# Patient Record
Sex: FEMALE | Race: ASIAN | NOT HISPANIC OR LATINO | Employment: FULL TIME | URBAN - NONMETROPOLITAN AREA
[De-identification: names, ages, dates, MRNs, and addresses within clinical notes are randomized per-mention and may not be internally consistent; named-entity substitution may affect disease eponyms.]

---

## 2018-02-02 ENCOUNTER — OFFICE VISIT CONVERTED (OUTPATIENT)
Dept: FAMILY MEDICINE CLINIC | Age: 53
End: 2018-02-02
Attending: NURSE PRACTITIONER

## 2018-02-06 LAB
AGE GDLN ACOG TESTING: NORMAL
HPV I/H RISK 4 DNA CVX QL PROBE+SIG AMP: NEGATIVE

## 2018-04-03 ENCOUNTER — OFFICE VISIT CONVERTED (OUTPATIENT)
Dept: FAMILY MEDICINE CLINIC | Age: 53
End: 2018-04-03
Attending: NURSE PRACTITIONER

## 2019-01-08 ENCOUNTER — HOSPITAL ENCOUNTER (OUTPATIENT)
Dept: OTHER | Facility: HOSPITAL | Age: 54
Discharge: HOME | End: 2019-01-08
Attending: NURSE PRACTITIONER

## 2019-01-08 ENCOUNTER — OFFICE VISIT CONVERTED (OUTPATIENT)
Dept: FAMILY MEDICINE CLINIC | Age: 54
End: 2019-01-08
Attending: NURSE PRACTITIONER

## 2019-01-08 LAB
ALBUMIN SERPL-MCNC: 4 G/DL (ref 3.5–5)
ALBUMIN/GLOB SERPL: 1.1 {RATIO} (ref 1.4–2.6)
ALP SERPL-CCNC: 75 U/L (ref 53–141)
ALT SERPL-CCNC: 19 U/L (ref 10–40)
ANION GAP SERPL CALC-SCNC: 17 MMOL/L (ref 8–19)
AST SERPL-CCNC: 24 U/L (ref 15–50)
BASOPHILS # BLD MANUAL: 0.08 10*3/UL (ref 0–0.2)
BASOPHILS NFR BLD MANUAL: 1.3 % (ref 0–3)
BILIRUB SERPL-MCNC: 0.26 MG/DL (ref 0.2–1.3)
BUN SERPL-MCNC: 15 MG/DL (ref 5–25)
BUN/CREAT SERPL: 23 {RATIO} (ref 6–20)
CALCIUM SERPL-MCNC: 9.4 MG/DL (ref 8.7–10.4)
CHLORIDE SERPL-SCNC: 103 MMOL/L (ref 99–111)
CHOLEST SERPL-MCNC: 183 MG/DL (ref 107–200)
CHOLEST/HDLC SERPL: 3 {RATIO} (ref 3–6)
CONV CO2: 26 MMOL/L (ref 22–32)
CONV TOTAL PROTEIN: 7.7 G/DL (ref 6.3–8.2)
CREAT UR-MCNC: 0.64 MG/DL (ref 0.5–0.9)
DEPRECATED RDW RBC AUTO: 34.4 FL
EOSINOPHIL # BLD MANUAL: 0.68 10*3/UL (ref 0–0.7)
EOSINOPHIL NFR BLD MANUAL: 11.5 % (ref 0–7)
ERYTHROCYTE [DISTWIDTH] IN BLOOD BY AUTOMATED COUNT: 16.5 % (ref 11.5–14.5)
GFR SERPLBLD BASED ON 1.73 SQ M-ARVRAT: >60 ML/MIN/{1.73_M2}
GLOBULIN UR ELPH-MCNC: 3.7 G/DL (ref 2–3.5)
GLUCOSE SERPL-MCNC: 85 MG/DL (ref 65–99)
GRANS (ABSOLUTE): 2.8 10*3/UL (ref 2–8)
GRANS: 47.2 % (ref 30–85)
HBA1C MFR BLD: 11.4 G/DL (ref 12–16)
HCT VFR BLD AUTO: 37.5 % (ref 37–47)
HDLC SERPL-MCNC: 62 MG/DL (ref 40–60)
IMM GRANULOCYTES # BLD: 0.01 10*3/UL (ref 0–0.54)
IMM GRANULOCYTES NFR BLD: 0.2 % (ref 0–0.43)
LDLC SERPL CALC-MCNC: 99 MG/DL (ref 70–100)
LYMPHOCYTES # BLD MANUAL: 1.9 10*3/UL (ref 1–5)
LYMPHOCYTES NFR BLD MANUAL: 7.8 % (ref 3–10)
MCH RBC QN AUTO: 19.5 PG (ref 27–31)
MCHC RBC AUTO-ENTMCNC: 30.4 G/DL (ref 33–37)
MCV RBC AUTO: 64 FL (ref 81–99)
MONOCYTES # BLD AUTO: 0.46 10*3/UL (ref 0.2–1.2)
OSMOLALITY SERPL CALC.SUM OF ELEC: 292 MOSM/KG (ref 273–304)
PLATELET # BLD AUTO: 281 10*3/UL (ref 130–400)
PMV BLD AUTO: 9.9 FL (ref 7.4–10.4)
POTASSIUM SERPL-SCNC: 4.6 MMOL/L (ref 3.5–5.3)
RBC # BLD AUTO: 5.86 10*6/UL (ref 4.2–5.4)
SODIUM SERPL-SCNC: 141 MMOL/L (ref 135–147)
TRIGL SERPL-MCNC: 110 MG/DL (ref 40–150)
TSH SERPL-ACNC: 1.43 M[IU]/L (ref 0.27–4.2)
VARIANT LYMPHS NFR BLD MANUAL: 32 % (ref 20–45)
VLDLC SERPL-MCNC: 22 MG/DL (ref 5–37)
WBC # BLD AUTO: 5.93 10*3/UL (ref 4.8–10.8)

## 2019-01-10 LAB — HCV AB SER DONR QL: 0.2 S/CO RATIO (ref 0–0.9)

## 2019-03-09 ENCOUNTER — OFFICE VISIT CONVERTED (OUTPATIENT)
Dept: FAMILY MEDICINE CLINIC | Age: 54
End: 2019-03-09
Attending: NURSE PRACTITIONER

## 2019-04-26 ENCOUNTER — OFFICE VISIT CONVERTED (OUTPATIENT)
Dept: FAMILY MEDICINE CLINIC | Age: 54
End: 2019-04-26
Attending: NURSE PRACTITIONER

## 2019-06-07 ENCOUNTER — OFFICE VISIT CONVERTED (OUTPATIENT)
Dept: FAMILY MEDICINE CLINIC | Age: 54
End: 2019-06-07
Attending: FAMILY MEDICINE

## 2019-06-07 ENCOUNTER — HOSPITAL ENCOUNTER (OUTPATIENT)
Dept: OTHER | Facility: HOSPITAL | Age: 54
Discharge: HOME | End: 2019-06-07
Attending: FAMILY MEDICINE

## 2019-07-15 ENCOUNTER — OFFICE VISIT CONVERTED (OUTPATIENT)
Dept: FAMILY MEDICINE CLINIC | Age: 54
End: 2019-07-15
Attending: NURSE PRACTITIONER

## 2020-01-27 ENCOUNTER — HOSPITAL ENCOUNTER (OUTPATIENT)
Dept: OTHER | Facility: HOSPITAL | Age: 55
Discharge: HOME | End: 2020-01-27
Attending: NURSE PRACTITIONER

## 2020-01-27 LAB
ALBUMIN SERPL-MCNC: 3.8 G/DL (ref 3.5–5)
ALBUMIN/GLOB SERPL: 1 {RATIO} (ref 1.4–2.6)
ALP SERPL-CCNC: 65 U/L (ref 53–141)
ALT SERPL-CCNC: 24 U/L (ref 10–40)
ANION GAP SERPL CALC-SCNC: 18 MMOL/L (ref 8–19)
AST SERPL-CCNC: 36 U/L (ref 15–50)
BASOPHILS # BLD AUTO: 0.06 10*3/UL (ref 0–0.2)
BASOPHILS NFR BLD AUTO: 0.9 % (ref 0–3)
BILIRUB SERPL-MCNC: 0.43 MG/DL (ref 0.2–1.3)
BUN SERPL-MCNC: 14 MG/DL (ref 5–25)
BUN/CREAT SERPL: 26 {RATIO} (ref 6–20)
CALCIUM SERPL-MCNC: 9.1 MG/DL (ref 8.7–10.4)
CHLORIDE SERPL-SCNC: 105 MMOL/L (ref 99–111)
CHOLEST SERPL-MCNC: 192 MG/DL (ref 107–200)
CHOLEST/HDLC SERPL: 3 {RATIO} (ref 3–6)
CONV ABS IMM GRAN: 0.01 10*3/UL (ref 0–0.2)
CONV CO2: 22 MMOL/L (ref 22–32)
CONV IMMATURE GRAN: 0.1 % (ref 0–1.8)
CONV TOTAL PROTEIN: 7.5 G/DL (ref 6.3–8.2)
CREAT UR-MCNC: 0.54 MG/DL (ref 0.5–0.9)
DEPRECATED RDW RBC AUTO: 33.5 FL (ref 36.4–46.3)
EOSINOPHIL # BLD AUTO: 0.36 10*3/UL (ref 0–0.7)
EOSINOPHIL # BLD AUTO: 5.2 % (ref 0–7)
ERYTHROCYTE [DISTWIDTH] IN BLOOD BY AUTOMATED COUNT: 15.1 % (ref 11.7–14.4)
GFR SERPLBLD BASED ON 1.73 SQ M-ARVRAT: >60 ML/MIN/{1.73_M2}
GLOBULIN UR ELPH-MCNC: 3.7 G/DL (ref 2–3.5)
GLUCOSE SERPL-MCNC: 103 MG/DL (ref 65–99)
HCT VFR BLD AUTO: 37.1 % (ref 37–47)
HDLC SERPL-MCNC: 65 MG/DL (ref 40–60)
HGB BLD-MCNC: 11.2 G/DL (ref 12–16)
LDLC SERPL CALC-MCNC: 117 MG/DL (ref 70–100)
LYMPHOCYTES # BLD AUTO: 2.28 10*3/UL (ref 1–5)
LYMPHOCYTES NFR BLD AUTO: 32.8 % (ref 20–45)
MCH RBC QN AUTO: 19.6 PG (ref 27–31)
MCHC RBC AUTO-ENTMCNC: 30.2 G/DL (ref 33–37)
MCV RBC AUTO: 64.9 FL (ref 81–99)
MONOCYTES # BLD AUTO: 0.35 10*3/UL (ref 0.2–1.2)
MONOCYTES NFR BLD AUTO: 5 % (ref 3–10)
NEUTROPHILS # BLD AUTO: 3.9 10*3/UL (ref 2–8)
NEUTROPHILS NFR BLD AUTO: 56 % (ref 30–85)
NRBC CBCN: 0 % (ref 0–0.7)
OSMOLALITY SERPL CALC.SUM OF ELEC: 293 MOSM/KG (ref 273–304)
PLATELET # BLD AUTO: 207 10*3/UL (ref 130–400)
PMV BLD AUTO: 10.4 FL (ref 9.4–12.3)
POTASSIUM SERPL-SCNC: 4 MMOL/L (ref 3.5–5.3)
RBC # BLD AUTO: 5.72 10*6/UL (ref 4.2–5.4)
SODIUM SERPL-SCNC: 141 MMOL/L (ref 135–147)
TRIGL SERPL-MCNC: 50 MG/DL (ref 40–150)
TSH SERPL-ACNC: 0.67 M[IU]/L (ref 0.27–4.2)
VLDLC SERPL-MCNC: 10 MG/DL (ref 5–37)
WBC # BLD AUTO: 6.96 10*3/UL (ref 4.8–10.8)

## 2020-02-24 ENCOUNTER — OFFICE VISIT CONVERTED (OUTPATIENT)
Dept: FAMILY MEDICINE CLINIC | Age: 55
End: 2020-02-24
Attending: NURSE PRACTITIONER

## 2020-03-11 ENCOUNTER — HOSPITAL ENCOUNTER (OUTPATIENT)
Dept: OTHER | Facility: HOSPITAL | Age: 55
Discharge: HOME | End: 2020-03-11
Attending: NURSE PRACTITIONER

## 2020-09-14 ENCOUNTER — OFFICE VISIT CONVERTED (OUTPATIENT)
Dept: FAMILY MEDICINE CLINIC | Age: 55
End: 2020-09-14
Attending: NURSE PRACTITIONER

## 2021-01-25 ENCOUNTER — OFFICE VISIT CONVERTED (OUTPATIENT)
Dept: FAMILY MEDICINE CLINIC | Age: 56
End: 2021-01-25
Attending: NURSE PRACTITIONER

## 2021-01-25 ENCOUNTER — HOSPITAL ENCOUNTER (OUTPATIENT)
Dept: OTHER | Facility: HOSPITAL | Age: 56
Discharge: HOME | End: 2021-01-25
Attending: NURSE PRACTITIONER

## 2021-01-25 LAB
25(OH)D3 SERPL-MCNC: 25.2 NG/ML (ref 30–100)
ALBUMIN SERPL-MCNC: 4.1 G/DL (ref 3.5–5)
ALBUMIN/GLOB SERPL: 1 {RATIO} (ref 1.4–2.6)
ALP SERPL-CCNC: 82 U/L (ref 53–141)
ALT SERPL-CCNC: 18 U/L (ref 10–40)
ANION GAP SERPL CALC-SCNC: 12 MMOL/L (ref 8–19)
AST SERPL-CCNC: 25 U/L (ref 15–50)
BASOPHILS # BLD MANUAL: 0.09 10*3/UL (ref 0–0.2)
BASOPHILS NFR BLD MANUAL: 1.5 % (ref 0–3)
BILIRUB SERPL-MCNC: 0.52 MG/DL (ref 0.2–1.3)
BUN SERPL-MCNC: 13 MG/DL (ref 5–25)
BUN/CREAT SERPL: 20 {RATIO} (ref 6–20)
CALCIUM SERPL-MCNC: 9.4 MG/DL (ref 8.7–10.4)
CHLORIDE SERPL-SCNC: 101 MMOL/L (ref 99–111)
CHOLEST SERPL-MCNC: 202 MG/DL (ref 107–200)
CHOLEST/HDLC SERPL: 2.5 {RATIO} (ref 3–6)
CONV CO2: 27 MMOL/L (ref 22–32)
CONV TOTAL PROTEIN: 8.1 G/DL (ref 6.3–8.2)
CREAT UR-MCNC: 0.64 MG/DL (ref 0.5–0.9)
DEPRECATED RDW RBC AUTO: 34.3 FL
EOSINOPHIL # BLD MANUAL: 0.37 10*3/UL (ref 0–0.7)
EOSINOPHIL NFR BLD MANUAL: 6.1 % (ref 0–7)
ERYTHROCYTE [DISTWIDTH] IN BLOOD BY AUTOMATED COUNT: 17 % (ref 11.5–14.5)
GFR SERPLBLD BASED ON 1.73 SQ M-ARVRAT: >60 ML/MIN/{1.73_M2}
GLOBULIN UR ELPH-MCNC: 4 G/DL (ref 2–3.5)
GLUCOSE SERPL-MCNC: 106 MG/DL (ref 65–99)
GRANS (ABSOLUTE): 3.23 10*3/UL (ref 2–8)
GRANS: 53.3 % (ref 30–85)
HBA1C MFR BLD: 12.2 G/DL (ref 12–16)
HCT VFR BLD AUTO: 39.4 % (ref 37–47)
HDLC SERPL-MCNC: 82 MG/DL (ref 40–60)
IMM GRANULOCYTES # BLD: 0.01 10*3/UL (ref 0–0.54)
IMM GRANULOCYTES NFR BLD: 0.2 % (ref 0–0.43)
LDLC SERPL CALC-MCNC: 106 MG/DL (ref 70–100)
LYMPHOCYTES # BLD MANUAL: 2.03 10*3/UL (ref 1–5)
LYMPHOCYTES NFR BLD MANUAL: 5.3 % (ref 3–10)
MCH RBC QN AUTO: 19.8 PG (ref 27–31)
MCHC RBC AUTO-ENTMCNC: 31 G/DL (ref 33–37)
MCV RBC AUTO: 64.1 FL (ref 81–99)
MONOCYTES # BLD AUTO: 0.32 10*3/UL (ref 0.2–1.2)
OSMOLALITY SERPL CALC.SUM OF ELEC: 283 MOSM/KG (ref 273–304)
PLATELET # BLD AUTO: 299 10*3/UL (ref 130–400)
PMV BLD AUTO: 9.9 FL (ref 7.4–10.4)
POTASSIUM SERPL-SCNC: 4.3 MMOL/L (ref 3.5–5.3)
RBC # BLD AUTO: 6.15 10*6/UL (ref 4.2–5.4)
SODIUM SERPL-SCNC: 136 MMOL/L (ref 135–147)
TRIGL SERPL-MCNC: 71 MG/DL (ref 40–150)
TSH SERPL-ACNC: 0.53 M[IU]/L (ref 0.27–4.2)
VARIANT LYMPHS NFR BLD MANUAL: 33.6 % (ref 20–45)
VLDLC SERPL-MCNC: 14 MG/DL (ref 5–37)
WBC # BLD AUTO: 6.05 10*3/UL (ref 4.8–10.8)

## 2021-06-05 NOTE — PROGRESS NOTES
"Lisa Luna 1965     Office/Outpatient Visit    Visit Date:  09:39 am    Provider: Lelo Correa N.P. (Assistant: Dorene Correa MA)    Location: [facility]        Electronically signed by Lelo Correa N.P. on  2019 10:55:54 AM                             SUBJECTIVE:        CC:     Ms. Luna is a 53 year old  female.  Patient is here for yearly check up.;         HPI:         HEALTH RISK PROFILE (\"At Risk\" items are starred):     Smoking: ** Currently smokes ( 1-5 cig per day );     Cancer Screening: ** Not current with screening mammograms;     Current with Pap smears with no history of abnormal results;  ** Has never had a screening flexible sigmoidoscopy;     Immunization Status: last tetanud ;      Nutrition: tries to healthy some times;     Physical Activity: Exercises at least 3 times per week;     Alcohol/Drug Use: Rarely drinks alcohol; No illicit drug use;     Safety: Always wears seatbelt;     ROS:     CONSTITUTIONAL:  Negative for chills and fever.      EYES:  Negative for blurred vision.      E/N/T:  Negative for diminished hearing and nasal congestion.      CARDIOVASCULAR:  Negative for chest pain and palpitations.      RESPIRATORY:  Negative for recent cough and dyspnea.      GASTROINTESTINAL:  Negative for abdominal pain, nausea and vomiting.      MUSCULOSKELETAL:  Negative for arthralgias and myalgias.      INTEGUMENTARY/BREAST:  Negative for atypical mole(s) and rash.      NEUROLOGICAL:  Negative for paresthesias and weakness.      PSYCHIATRIC:  Negative for anxiety and depression.          PMH/FMH/SH:     Last Reviewed on 2019 09:50 AM by Lelo Correa    Past Medical History:                 PAST MEDICAL HISTORY     UNREMARKABLE         GYNECOLOGICAL HISTORY:        Menopause at age 48.          PREVENTIVE HEALTH MAINTENANCE             COLORECTAL CANCER SCREENING: declines colorectal cancer screening, understands " reason for testing     MAMMOGRAM: declines, understands reason for testing has never been done     PAP SMEAR: was last done  with normal results         Surgical History:     NONE         Family History:         Positive for Lymphoma ( father ).      Mother:  at age 75     Daughter(s): 2 daughter(s) total;  Type 2 Diabetes         Social History: [place]     Occupation: a factory.   [employer] works third shift /also works [employer] part time     Marital Status:      Children: 2 children         Tobacco/Alcohol/Supplements:     Last Reviewed on 2019 09:43 AM by Dorene Correa    Tobacco: Current Smoker: She currently smokes every day, 1-5 cigarettes per day.      Caffeine:  She admits to consuming caffeine via coffee ( 1 serving per day ).          Substance Abuse History:     Last Reviewed on 2019 09:42 AM by Dorene Correa    NEGATIVE         Mental Health History:     Last Reviewed on 2019 09:42 AM by Dorene Correa        Communicable Diseases (eg STDs):     Last Reviewed on 2019 09:42 AM by Dorene Correa            Immunizations:     Tuberculosis test, intradermal 2007     Tdap (Tetanus, reduced diph, acellular pertussis) 2009         Allergies:     Last Reviewed on 2019 09:41 AM by Dorene Correa    Erythromycin Base:        Current Medications:     Last Reviewed on 2019 09:41 AM by Dorene Correa    Ventolin HFA 90mcg/1actuation Oral Inhaler Inhale 2 puff(s) by mouth 4 times a day as needed         OBJECTIVE:        Vitals:         Current: 2019 9:42:15 AM    Ht:  5 ft, 2 in;  Wt: 129.6 lbs;  BMI: 23.7    T: 98.3 F (oral);  BP: 131/78 mm Hg (left arm, sitting);  P: 80 bpm (left arm (BP Cuff), sitting);  sCr: 0.6 mg/dL;  GFR: 98.69        Exams:     PHYSICAL EXAM:     GENERAL: vital signs recorded - well developed, well nourished;  no apparent distress;     EYES: extraocular movements intact; conjunctiva and cornea are normal; PERRLA;      E/N/T:  normal EACs, TMs, nasal/oral mucosa, teeth, gingiva, and oropharynx;     NECK: range of motion is normal; thyroid is non-palpable;     RESPIRATORY: normal respiratory rate and pattern with no distress; normal breath sounds with no rales, rhonchi, wheezes or rubs;     CARDIOVASCULAR: normal rate; rhythm is regular;  no systolic murmur; no edema;     GASTROINTESTINAL: nontender; normal bowel sounds; no organomegaly;     LYMPHATIC: no enlargement of cervical or facial nodes; no axillary adenopathy;     BREAST/INTEGUMENT: BREASTS: breast exam is normal without masses, skin changes, or nipple discharge; SKIN: no significant rashes or lesions; no suspicious moles;     MUSCULOSKELETAL:  Normal range of motion, strength and tone;     NEUROLOGIC: GROSSLY INTACT     PSYCHIATRIC:  appropriate affect and demeanor; normal speech pattern; grossly normal memory;         Procedures:     Vaccination against hepatitis A     1. Hepatitis A (adult): 1.0 ml given IM in the left upper arm; administered by mnp;  lot number [id number]; expires 9/11/21         Tetanus-diphtheria-acellular pertussis immunization [Tdap]     1. Boostrix (Tdap): 0.5 ml unit dose given IM in the right upper arm; administered by mnp;  lot number [id number]; expires 4/3/21             ASSESSMENT           V70.0   Z00.00  Annual exam              DDx:     V05.3   Z23  Vaccination against hepatitis A              DDx:     V06.1   Z23  Tetanus-diphtheria-acellular pertussis immunization [Tdap]              DDx:         ORDERS:         Lab Orders:       [id number]  [facility] PHYSICAL: CMP, CBC, TSH, LIPID: [id number], [id number], [id number], [id number]  (Send-Out)           Procedures Ordered:       [id number]  Immunization administration; one vaccine  (In-House)         40096  HepA vaccine adult dose for intramuscular use  (In-House)         77371  Immunization administration; each additional vaccine/toxoid  (In-House)         29071  Tdap vaccine, when administered to individuals 7 years or  older, for intramuscular use  (In-House)                   PLAN:          Annual exam advised to get colon screen, mammogram, and flu vaccine declines; advised to get updated tetanus and hep a today (ate cereal this am at 3)     LABORATORY:  Labs ordered to be performed today include PHYSICAL PANEL; CMP, CBC, TSH, LIPID.      COUNSELING provided on: breast self-exam, healthy eating habits, regular exercise, and use of seat belts.            Orders:       [id number]  [facility] PHYSICAL: CMP, CBC, TSH, LIPID: [id number], [id number], [id number], 67677  (Send-Out)             Patient Education Handouts:       Colonoscopy        Mammography           Vaccination against hepatitis A             FOLLOW-UP: Schedule a follow-up appointment in 6 months.            Orders:       87359  Immunization administration; one vaccine  (In-House)         24021  HepA vaccine adult dose for intramuscular use  (In-House)             Patient Education Handouts:       Hepatitis A           Tetanus-diphtheria-acellular pertussis immunization [Tdap]           Orders:       63133  Immunization administration; each additional vaccine/toxoid  (In-House)         43688  Tdap vaccine, when administered to individuals 7 years or older, for intramuscular use  (In-House)               Patient Recommendations:        For  Annual exam:         You should regularly examine your breasts, easily done while in the shower or with lotion.  Feel and look for differences in consistency from month to month, especially noting knots or lumps, changes in skin appearance, nipple retraction or discharge.    Limit dietary intake of fat (especially saturated fat) and cholesterol.  Eat a variety of foods, including plenty of fruits, vegetables, and grain containg fiber, limit fat intake to 30% of total calories. Balance caloric intake with energy expended.    Maintaining regular physical activity is advised to help prevent heart disease, hypertension, diabetes, and obesity.    Always  use shoulder/lap restraints when driving or riding in a vehicle, even those equipped with air bags.          For  Vaccination against hepatitis A:                 FOLLOW-UP: Schedule a follow-up visit in 6 months.              CHARGE CAPTURE           **Please note: ICD descriptions below are intended for billing purposes only and may not represent clinical diagnoses**        Primary Diagnosis:         V70.0 Annual exam            Z00.00    Encounter for general adult medical examination without abnormal findings              Orders:          51960   Preventive medicine, established patient, age 40-64 years  (In-House)           V05.3 Vaccination against hepatitis A            Z23    Encounter for immunization              Orders:          95404   Immunization administration; one vaccine  (In-House)             76194   HepA vaccine adult dose for intramuscular use  (In-House)           V06.1 Tetanus-diphtheria-acellular pertussis immunization [Tdap]            Z23    Encounter for immunization              Orders:          85381   Immunization administration; each additional vaccine/toxoid  (In-House)             11568   Tdap vaccine, when administered to individuals 7 years or older, for intramuscular use  (In-House)

## 2021-06-05 NOTE — PROGRESS NOTES
Lisa Luna 1965     Office/Outpatient Visit    Visit Date:  10:28 am    Provider: Ethan Bentley MD (Assistant: Johanna Charles MA)    Location: [facility]        Electronically signed by Ethan Bentley MD on  2019 09:32:23 PM                             SUBJECTIVE:        CC:     Ms. Luna is a 54 year old  female.  presents today due to SOA x 1 week         HPI:     Shortness of breath, breathing hard, and dry cough. Pt started smoking at the age of 17, she typically smoked 1/3 PPD x 37 years. Sx have been present for 3 months now. Albuterol helps a little. A little help with steroids.     ROS:     CONSTITUTIONAL:  Negative for chills and fever.      EYES:  Negative for eye drainage and eye pain.      E/N/T:  Positive for runny nose.   Negative for ear pain or sore throat.      CARDIOVASCULAR:  Negative for chest pain and palpitations.      RESPIRATORY:  Positive for recent cough ( typically dry ) and dyspnea.   Negative for frequent wheezing.      PSYCHIATRIC:  Negative for anxiety and depression.          PM/FM/:     Last Reviewed on 2019 09:50 AM by Lelo Correa    Past Medical History:                 PAST MEDICAL HISTORY     UNREMARKABLE         GYNECOLOGICAL HISTORY:        Menopause at age 48.          PREVENTIVE HEALTH MAINTENANCE             COLORECTAL CANCER SCREENING: declines colorectal cancer screening, understands reason for testing     Hepatitis C Medicare Screening: was last done 19; negative     MAMMOGRAM: declines, understands reason for testing has never been done     PAP SMEAR: was last done  with normal results         Surgical History:     NONE         Family History:         Positive for Lymphoma ( father ).      Mother:  at age 75     Daughter(s): 2 daughter(s) total;  Type 2 Diabetes         Social History: [facility]     Occupation: a [employer].   [employer] works third shift /also works [employer] part time      Marital Status:      Children: 2 children         Tobacco/Alcohol/Supplements:     Last Reviewed on 4/26/2019 11:53 AM by Alexandrea Hurst    Tobacco: Current Smoker: She currently smokes every day, 1-5 cigarettes per day.      Caffeine:  She admits to consuming caffeine via coffee ( 1 serving per day ).          Substance Abuse History:     Last Reviewed on 1/08/2019 09:42 AM by Dorene Correa    NEGATIVE         Mental Health History:     Last Reviewed on 1/08/2019 09:42 AM by Dorene Correa        Communicable Diseases (eg STDs):     Last Reviewed on 1/08/2019 09:42 AM by Dorene Correa            Current Problems:     Last Reviewed on 1/08/2019 09:42 AM by Dorene Correa    Fatigue     Anxiety, generalized     Thyroid nodule     Acute bronchitis     Cigarette smoking         Immunizations:     Hep A, adult dose 1/8/2019     Tuberculosis test, intradermal 1/18/2007     Boostrix (Tdap) 1/8/2019     Tdap (Tetanus, reduced diph, acellular pertussis) 6/16/2009         Allergies:     Last Reviewed on 4/26/2019 11:52 AM by Alexandrea Hurst    Erythromycin Base:        Current Medications:     Last Reviewed on 4/26/2019 11:52 AM by Alexandrea Hurst    Ventolin HFA 90mcg/1actuation Oral Inhaler Inhale 2 puff(s) by mouth 4 times a day as needed     Fexofenadine HCl 180mg Tablet 1 tab daily         OBJECTIVE:        Vitals:         Current: 6/7/2019 10:32:42 AM    Ht:  5 ft, 2 in;  Wt: 126.8 lbs;  BMI: 23.2    T: 97.5 F (oral);  BP: 117/81 mm Hg (left arm, sitting);  P: 85 bpm (left arm (BP Cuff), sitting);  sCr: 0.64 mg/dL;  GFR: 90.64    O2 Sat: 92 % (room air)        Repeat:     10:33:00 AM     O2 Sat:   94% (room air)         Exams:     PHYSICAL EXAM:     GENERAL: vital signs recorded - well developed, well nourished;  well groomed;  no apparent distress, appears moderately ill;     EYES: PERRL, EOMI     E/N/T: EARS: external auditory canal normal;  both TMs are dull;  NOSE: normal turbinates; no sinus  tenderness; OROPHARYNX: oral mucosa is normal; posterior pharynx shows no exudate and post nasal drip;     NECK: range of motion is normal; trachea is midline;     RESPIRATORY: normal respiratory rate and pattern with no distress; coarse breath sounds throughout;     CARDIOVASCULAR: normal rate; rhythm is regular;     NEUROLOGIC: mental status: alert and oriented x 3; GROSSLY INTACT     PSYCHIATRIC: appropriate affect and demeanor;         Procedures:     COPD        Nebulizer: Medication:ipratroprium bromide and albuterol sulfate 1 treatments were performed on patient. Pre-treatment Pulse: 85; O2 Sat: 94 Post-treatment Pulse: 81; O2 Sat: 95 Lot# [id number]  Expiration: jun 2020 Was treatment tolerated? yes; Administered by: [facility]           496   J44.0  COPD              DDx:         ORDERS:         Meds Prescribed:       Advair Diskus (Fluticasone/Salmeterol) 250mcg/50mcg per 1blister Inhalation Powder 1 puff bid  #1 (One) package Refills: 3       Augmentin (Amoxicillin/Clavulanate) 875mg/125mg Tablet 1 TAB BID  #14 (Fourteen) tablet(s) Refills: 0       Azithromycin 250mg Tablet 2 po today, 1 po x 4 days  #6 (Six) tablet(s) Refills: 0         Radiology/Test Orders:       [id number]  Radiologic exam chest 2 views  (Send-Out)           Lab Orders:       APPTO  Appointment need  (In-House)           Procedures Ordered:       [id number]  inhalation treatment for acute airway obstruction or for sputum induction for diagnostic purposes; e  (In-House)           Other Orders:       [id number]  Noninvasive ear or pulse oximetry for oxygen saturation; single determination  (In-House)                   PLAN:          COPDThis appears to be early developing COPD vs PNA. Given duration of Sx will give augmentin and azith to address potential PNA and pt given duoneb in clinic and started on advair for COPD. She will f/u with PCP in 6 weeks. CXR ordered to assess for PNA.         RADIOLOGY:  I have ordered a chest x-ray (PA and  lateral) to be done today.      TESTS/PROCEDURES:  Will proceed with Inhalation Treatment Albuterol 3 mg and Ipratropium Bromide 0.5mg to be performed/scheduled now.      FOLLOW-UP: Schedule a follow-up appointment in 6 weeks..            Prescriptions:       Advair Diskus (Fluticasone/Salmeterol) 250mcg/50mcg per 1blister Inhalation Powder 1 puff bid  #1 (One) package Refills: 3       Augmentin (Amoxicillin/Clavulanate) 875mg/125mg Tablet 1 TAB BID  #14 (Fourteen) tablet(s) Refills: 0       Azithromycin 250mg Tablet 2 po today, 1 po x 4 days  #6 (Six) tablet(s) Refills: 0           Orders:       [id number]  Noninvasive ear or pulse oximetry for oxygen saturation; single determination  (In-House)         85613  Radiologic exam chest 2 views  (Send-Out)         47353  inhalation treatment for acute airway obstruction or for sputum induction for diagnostic purposes; e  (In-House)         APPTO  Appointment need  (In-House)               Patient Recommendations:        COPD    Schedule a follow-up visit in 6 weeks.                APPOINTMENT INFORMATION:        Monday Tuesday Wednesday Thursday Friday Saturday Sunday            Time:___________________AM  PM   Date:_____________________             CHARGE CAPTURE           **Please note: ICD descriptions below are intended for billing purposes only and may not represent clinical diagnoses**        Primary Diagnosis:         496 COPD            J44.0    Chronic obstructive pulmonary disease with acute lower respiratory infection              Orders:          14021   Office/outpatient visit; established patient, level 4  (In-House)             03991   Noninvasive ear or pulse oximetry for oxygen saturation; single determination  (In-House)             92457   inhalation treatment for acute airway obstruction or for sputum induction for diagnostic purposes; e  (In-House)             APPTO   Appointment need  (In-House)               ADDENDUMS:       ____________________________________    Addendum: 06/19/2019 08:19 Johanna Mclaughlin        Please add  x1/kh

## 2021-06-05 NOTE — PROGRESS NOTES
Lisa Luna DIEGO 1965     Office/Outpatient Visit    Visit Date: Tue, Apr 3, 2018 11:34 am    Provider: Lelo Correa N.P. (Assistant: Allyssa Schmidt MA)    Location: [facility]        Electronically signed by Lelo Correa N.P. on  2018 12:59:42 PM                             SUBJECTIVE:        CC:     Ms. Luna is a 53 year old  female.  dry cough, SOA;         HPI:         Ms. Luna presents with upper respiratory illness.  These have been present for the past >1 weeks.  The symptoms include chest congestion, dry cough, wheezing and dyspnea.  She has already tried to relieve the symptoms with O.T.C. cough medication and albuterol.  Medical history is significant for cigarette smoking.      ROS:     CONSTITUTIONAL:  Negative for fever.      E/N/T:  Negative for sore throat.      CARDIOVASCULAR:  Negative for chest pain, palpitations, tachycardia, orthopnea, and edema.      RESPIRATORY:  Negative for pleuritic chest pain.          [facility]/:     Last Reviewed on 2018 11:59 AM by Lelo Correa    Past Medical History:                 PAST MEDICAL HISTORY     UNREMARKABLE         GYNECOLOGICAL HISTORY:        Menopause at age 48.          PREVENTIVE HEALTH MAINTENANCE             COLORECTAL CANCER SCREENING: declines colorectal cancer screening, understands reason for testing     MAMMOGRAM: declines, understands reason for testing has never been done     PAP SMEAR: was last done  with normal results Dr. Lopez         Surgical History:     NONE         Family History:         Positive for Lymphoma ( father ).      Mother:  at age 75         Social History:     Occupation: a factory.   [employer]     Marital Status:      Children: 2 children         Tobacco/Alcohol/Supplements:     Last Reviewed on 2018 11:36 AM by Allyssa Schmidt    Tobacco: Current Smoker: She currently smokes every day, 1-5 cigarettes per day.      Caffeine:  She admits to  consuming caffeine via coffee ( 1 serving per day ).              Immunizations:     Tuberculosis test, intradermal 1/18/2007     Tdap (Tetanus, reduced diph, acellular pertussis) 6/16/2009         Allergies:     Last Reviewed on 4/03/2018 11:35 AM by Allyssa Schmidt    Erythromycin Base:        Current Medications:     Last Reviewed on 4/03/2018 11:36 AM by Allyssa Schmidt    Ventolin HFA 90mcg/1actuation Oral Inhaler Inhale 2 puff(s) by mouth 4 times a day as needed         OBJECTIVE:        Vitals:         Current: 4/3/2018 11:35:33 AM    Ht:  5 ft, 2 in;  Wt: 130.2 lbs;  BMI: 23.8    T: 98.2 F (oral);  BP: 114/79 mm Hg (left arm, sitting);  P: 81 bpm (left arm (BP Cuff), sitting);  sCr: 0.6 mg/dL;  GFR: 98.88    O2 Sat: 95 % (room air)        Exams:     PHYSICAL EXAM:     GENERAL:  well developed and nourished; appropriately groomed; in no apparent distress;     E/N/T: EARS:  normal external auditory canals and tympanic membranes;  grossly normal hearing; NOSE:  normal nasal mucosa, septum, turbinates, and sinuses; OROPHARYNX:  normal mucosa, dentition, gingiva, and posterior pharynx;     RESPIRATORY: normal respiratory rate and pattern with no distress; decreased breath sounds throughout; inspiratory wheezes in the apices;     CARDIOVASCULAR: normal rate; rhythm is regular;  no systolic murmur;     LYMPHATIC: no enlargement of cervical or facial nodes;         ASSESSMENT           466.0   J20.9  Acute bronchitis              DDx:         ORDERS:         Meds Prescribed:       Medrol (Methylprednisolone) 4mg Dosepak Take as directed with food  #1 (One) dose pack Refills: 0       Ceftin (Cefuroxime Axetil)  Take 1 tablet(s) by mouth bid  #20 (Twenty) tablet(s) Refills: 0         Radiology/Test Orders:       [id number]  Radiologic exam chest 2 views  (Send-Out)           Other Orders:       [id number]  Noninvasive ear or pulse oximetry for oxygen saturation; single determination  (In-House)                   PLAN:           Acute bronchitis  nothing acute on CXR, [phone] /she needs a work note for 3-30-18, covered          RADIOLOGY:  I have ordered a chest x-ray (PA and lateral) to be done today.      RECOMMENDATIONS given include: rest, increase oral fluid intake, and quit smoking.      FOLLOW-UP: Advised to call if there is no improvement in 4 day(s).            Prescriptions:       Medrol (Methylprednisolone) 4mg Dosepak Take as directed with food  #1 (One) dose pack Refills: 0       Ceftin (Cefuroxime Axetil)  Take 1 tablet(s) by mouth bid  #20 (Twenty) tablet(s) Refills: 0           Orders:       [id number]  Noninvasive ear or pulse oximetry for oxygen saturation; single determination  (In-House)         35409  Radiologic exam chest 2 views  (Send-Out)             Patient Education Handouts:       Smoking Cessation              CHARGE CAPTURE           **Please note: ICD descriptions below are intended for billing purposes only and may not represent clinical diagnoses**        Primary Diagnosis:         466.0 Acute bronchitis            J20.9    Acute bronchitis, unspecified              Orders:          [id number]   Office/outpatient visit; established patient, level 3  (In-House)             74663   Noninvasive ear or pulse oximetry for oxygen saturation; single determination  (In-House)

## 2021-06-05 NOTE — PROGRESS NOTES
Lisa Luna  1965     Office/Outpatient Visit    Visit Date: Mon, Sep 14, 2020 09:52 am    Provider: Ness Rivera N.P. (Assistant: Jeni Cabrera, )    Location: [facility]        Electronically signed by Ness Rivera N.P. on  2020 10:18:34 AM                             Subjective:        CC: Mrs. Luna is a 55 year old  female.  feeling bad since Friday, has not been to work since;         HPI: Lisa c/o symptoms 3 days ago of feeling tired. She called in to work to catch up on sleep and was told that she needed note for work. Denies fever, URI symptoms, GI symptoms, loss of taste. Feeling better today.    ROS:     CONSTITUTIONAL:  Positive for fatigue.   Negative for chills or fever.      EYES:  Negative for eye drainage and eye pain.      E/N/T:  Negative for ear pain and sore throat.      CARDIOVASCULAR:  Negative for chest pain and palpitations.      RESPIRATORY:  Negative for dyspnea and frequent wheezing.      GASTROINTESTINAL:  Negative for abdominal pain and vomiting.      PSYCHIATRIC:  Negative for depression and suicidal thoughts.          Past Medical History / Family History / Social History:         Last Reviewed on 2020 11:10 AM by Lelo Correa    Past Medical History:                 PAST MEDICAL HISTORY     UNREMARKABLE         GYNECOLOGICAL HISTORY:        Menopause at age 48.          PREVENTIVE HEALTH MAINTENANCE             COLORECTAL CANCER SCREENING: declines colorectal cancer screening, understands reason for testing     Hepatitis C Medicare Screening: was last done 19; negative     MAMMOGRAM: was last done 20 with normal results     PAP SMEAR: was last done  with normal results         Surgical History:     NONE         Family History:         Positive for Lymphoma ( father ).  Father:  at age 76;  lymphoma     Mother:  at age 75     Daughter(s): 2 daughter(s) total;  Type 2 Diabetes         Social  History: [employer]     Occupation: a factory.   [employer] works second shift /also works [employer] part time     Marital Status:      Children: 2 children         Tobacco/Alcohol/Supplements:     Last Reviewed on 2/24/2020 11:05 AM by Michelle Cristina    Tobacco: Current Smoker: She currently smokes every day, 1-5 cigarettes per day.      Caffeine:  She admits to consuming caffeine via coffee ( 1 serving per day ).          Substance Abuse History:     Last Reviewed on 1/08/2019 09:42 AM by Dorene Correa    NEGATIVE         Mental Health History:     Last Reviewed on 1/08/2019 09:42 AM by Dorene Correa        Communicable Diseases (eg STDs):     Last Reviewed on 1/08/2019 09:42 AM by Dorene Correa        Current Problems:     Last Reviewed on 1/08/2019 09:42 AM by Dorene Correa    Thyroid nodule    Fatigue    Anxiety, generalized    Nicotine dependence, unspecified, uncomplicated    Cigarette smoking    COPD    Chronic obstructive pulmonary disease with acute lower respiratory infection    Screening for depression    Encounter for screening for depression    Encounter for general adult medical examination without abnormal findings    Rash and other nonspecific skin eruption        Immunizations:     Hep A, adult dose 1/8/2019    Tuberculosis test, intradermal 1/18/2007    Boostrix (Tdap) 1/8/2019    Tdap (Tetanus, reduced diph, acellular pertussis) 6/16/2009        Allergies:     Last Reviewed on 2/24/2020 11:05 AM by Michelle Cristina    Erythromycin Base:          Current Medications:     Last Reviewed on 2/24/2020 11:05 AM by Michelle Cristina    Ventolin HFA 90 mcg/actuation Inhalation HFA Aerosol Inhaler [Inhale 2 puff(s) by mouth 4 times a day as needed]    Advair Diskus 250-50 mcg/dose Inhalation Blister, With Inhalation Device [1 puff bid]    Anusol-HC 2.5 % Topical cream with perineal applicator [apply a thin layer to the affected area(s) by topical route 2 times per day]         Objective:        Vitals:         Historical:     2/24/2020  BP:   135/89 mm Hg ( (right arm, , sitting, );) 2/24/2020  P:   71bpm ( (right arm (BP Cuff), , sitting, );) 2/24/2020  Wt:   130.6lbs    Current: 9/14/2020 9:58:59 AM    Ht:  5 ft, 2 in;  Wt: 128.7 lbs;  BMI: 23.5T: 96.9 F (temporal);  BP: 114/69 mm Hg (left arm, sitting);  P: 69 bpm (left arm (BP Cuff), sitting);  sCr: 0.54 mg/dL;  GFR: 106.88        Exams:     PHYSICAL EXAM:     GENERAL: well developed, well nourished;  no apparent distress;     EYES: PERRL, EOMI     E/N/T: EARS: external auditory canal normal;  bilateral TMs are normal;  NOSE: normal turbinates; no sinus tenderness; OROPHARYNX: oral mucosa is normal; posterior pharynx shows no exudate;     NECK: range of motion is normal; trachea is midline;     RESPIRATORY: normal respiratory rate and pattern with no distress; normal breath sounds with no rales, rhonchi, wheezes or rubs;     CARDIOVASCULAR: normal rate; rhythm is regular;     GASTROINTESTINAL: nontender; normal bowel sounds; no organomegaly;     NEUROLOGIC: mental status: alert and oriented x 3; GROSSLY INTACT     PSYCHIATRIC: appropriate affect and demeanor;         Assessment:         R53.83   Other fatigue       Z13.31   Encounter for screening for depression           ORDERS:         Other Orders:         Depression screen negative  (In-House)                      Plan:         Other fatigueFeeling better today. Asymptomatic. Can return to work.         FOLLOW-UP: Chronic visit follow up School/Work Excuse for Friday 9/11/2020         Encounter for screening for depression    MIPS PHQ-9 Depression Screening: Completed form scanned and in chart; Total Score 0; Negative Depression Screen           Orders:         Depression screen negative  (In-House)                  Charge Capture:         Primary Diagnosis:     R53.83  Other fatigue           Orders:      60500  Office/outpatient visit; established patient, level 3   (In-House)              Z13.31  Encounter for screening for depression           Orders:        Depression screen negative  (In-House)

## 2021-06-05 NOTE — PROGRESS NOTES
Lisa Luna 1965     Office/Outpatient Visit    Visit Date: Mon, Jul 15, 2019 09:44 am    Provider: Lelo Correa N.P. (Assistant: Umm Valdez)    Location: [facility]        Electronically signed by Lelo Correa N.P. on  07/15/2019 10:45:11 AM                             SUBJECTIVE:        CC:     Ms. Luna is a 54 year old  female.  This is a follow-up visit.  6 wk f/u, medication refills, sore on left shin.;         HPI:     abnormal CXR     The symptom began >2 months ago.  Associated symptoms include cough.  Prior work-up has included CXR irregualr area / treated for pneumonia.  She was seen here in April and then again in .  Her symptoms have cleared up.  She took Augmentin and Zpack.  She said the CXR was very expensive.           PHQ-9 Depression Screening: Completed form scanned and in chart; Total Score 1 Alcohol Consumption Screening: Completed form scanned and in chart; Total Score 0     ROS:     CONSTITUTIONAL:  Negative for chills, fatigue, fever, and weight change.      CARDIOVASCULAR:  Negative for chest pain, palpitations, tachycardia, orthopnea, and edema.      RESPIRATORY:  Positive for wheezing ( occ, needs a refill on her ventolin to use if needed ).   Negative for recent cough.      INTEGUMENTARY/BREAST:  Positive for skin lesion on left lower leg since April, not cleared.  it itches, sun irritates her skin, she has areas that blister up at times, itch and the one on her lower leg has not cleared up     NEUROLOGICAL:  Negative for dizziness, headaches, paresthesias, and weakness.          PMH/FMH/SH:     Last Reviewed on 7/15/2019 10:04 AM by Lelo Correa    Past Medical History:                 PAST MEDICAL HISTORY     UNREMARKABLE         GYNECOLOGICAL HISTORY:        Menopause at age 48.          PREVENTIVE HEALTH MAINTENANCE             COLORECTAL CANCER SCREENING: declines colorectal cancer screening, understands reason for  testing     Hepatitis C Medicare Screening: was last done 19; negative     MAMMOGRAM: declines, understands reason for testing has never been done     PAP SMEAR: was last done  with normal results         Surgical History:     NONE         Family History:         Positive for Lymphoma ( father ).      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      Caffeine:  She admits to consuming caffeine via coffee ( 1 serving per day ).          Substance Abuse History:     Last Reviewed on 2019 09:42 AM by Dorene Correa    NEGATIVE         Mental Health History:     Last Reviewed on 2019 09:42 AM by Dorene Correa        Communicable Diseases (eg STDs):     Last Reviewed on 2019 09:42 AM by Dorene Correa            Immunizations:     Hep A, adult dose 2019     Tuberculosis test, intradermal 2007     Boostrix (Tdap) 2019     Tdap (Tetanus, reduced diph, acellular pertussis) 2009         Allergies:     Last Reviewed on 7/15/2019 09:45 AM by Umm Valdez    Erythromycin Base:        Current Medications:     Last Reviewed on 7/15/2019 10:42 AM by Lelo Correa    Ventolin HFA 90mcg/1actuation Oral Inhaler Inhale 2 puff(s) by mouth 4 times a day as needed     Fexofenadine HCl 180mg Tablet 1 tab daily         OBJECTIVE:        Vitals:         Current: 7/15/2019 9:50:25 AM    Ht:  5 ft, 2 in;  Wt: 130.8 lbs;  BMI: 23.9    T: 97.8 F (oral);  BP: 123/78 mm Hg (right arm, sitting);  P: 81 bpm (right arm (BP Cuff), sitting);  sCr: 0.64 mg/dL;  GFR: 91.84    O2 Sat: 100 % (room air)        Exams:      PHYSICAL EXAM:     GENERAL: vital signs recorded - well developed, well nourished;  no apparent distress;     RESPIRATORY: normal respiratory rate and pattern with no distress; normal breath sounds with no rales, rhonchi, wheezes or rubs;     CARDIOVASCULAR: normal rate; rhythm is regular;  no systolic murmur; no edema;     BREAST/INTEGUMENT: mucular area, dark brown on left lower leg 1 cm; no exudate, tiny excoriated area in center     PSYCHIATRIC:  appropriate affect and demeanor; normal speech pattern; grossly normal memory;         ASSESSMENT           793.2   R91.8  Radiologic abnormality of chest, NEC              DDx:     238.2   R21  Atypical skin lesion              DDx:     V79.0   Z13.31  Screening for depression              DDx:         ORDERS:         Meds Prescribed:       Refill of: Ventolin HFA (Albuterol) 90mcg/1actuation Oral Inhaler Inhale 2 puff(s) by mouth 4 times a day as needed  #1 (One) inhaler(s) Refills: 1       Triamcinolone Acetonide 0.1% Cream Apply BID  #60 (Sixty) gm Refills: 0         Radiology/Test Orders:       [id number]  Radiologic exam chest 2 views  (Send-Out)           Procedures Ordered:       REFER  Referral to Specialist or Other Facility  (Send-Out)           Other Orders:         Depression screen negative  (In-House)           Negative EtOH screen  (In-House)                   PLAN:          Radiologic abnormality of chest, NEC reviewed CXR advised her to repeat CXR, she declined, copy of CXR report given to patient          RADIOLOGY:  I have ordered a chest x-ray (PA and lateral) to be done today.            Orders:       64015  Radiologic exam chest 2 views  (Send-Out)            Atypical skin lesion         REFERRALS:  Referral initiated to a dermatologist ( Dr. Maria Luisa Parada; for evaluation of skin lesion on left lower leg ).            Prescriptions:       Triamcinolone Acetonide 0.1% Cream Apply BID  #60 (Sixty) gm Refills: 0           Orders:       REFER   Referral to Specialist or Other Facility  (Send-Out)            Screening for depression     MIPS PHQ-9 Depression Screening: Completed form scanned and in chart; Total Score 1; Negative Depression Screen Negative alcohol screen           Orders:         Depression screen negative  (In-House)           Negative EtOH screen  (In-House)               Other Prescriptions:       Refill of: Ventolin HFA (Albuterol) 90mcg/1actuation Oral Inhaler Inhale 2 puff(s) by mouth 4 times a day as needed  #1 (One) inhaler(s) Refills: 1         CHARGE CAPTURE           **Please note: ICD descriptions below are intended for billing purposes only and may not represent clinical diagnoses**        Primary Diagnosis:         793.2 Radiologic abnormality of chest, NEC            R91.8    Other nonspecific abnormal finding of lung field              Orders:          68924   Office/outpatient visit; established patient, level 3  (In-House)           238.2 Atypical skin lesion            R21    Rash and other nonspecific skin eruption    V79.0 Screening for depression            Z13.31    Encounter for screening for depression              Orders:             Depression screen negative  (In-House)                Negative EtOH screen  (In-House)

## 2021-06-05 NOTE — PROGRESS NOTES
Lisa Luna 1965     Office/Outpatient Visit    Visit Date:  11:50 am    Provider: Ness Rivera N.P. (Assistant: Alexandrea Hurst MA)    Location: [facility]        Electronically signed by Ness Rivera N.P. on  2019 12:37:27 PM                             SUBJECTIVE:        CC:     Ms. Luna is a 54 year old  female.  presents today due to complaints of cough, SOB X 2 weeks         HPI:         Upper respiratory illness noted.  These have been present for the past 2 weeks.  The symptoms include chest congestion, dry cough and dyspnea.  She denies fever.  She has already tried to relieve the symptoms with Tessalon perles cough medication.  Medical history is significant for seasonal allergies and cigarette smoking.  She is working on trying to quit smoking. Has not smoked for several days.     ROS:     CONSTITUTIONAL:  Negative for chills and fever.      EYES:  Negative for eye drainage and eye pain.      E/N/T:  Positive for runny nose.   Negative for ear pain or sore throat.      CARDIOVASCULAR:  Negative for chest pain and palpitations.      RESPIRATORY:  Positive for recent cough ( typically dry ) and dyspnea.   Negative for frequent wheezing.          [facility]/:     Last Reviewed on 2019 09:50 AM by Lelo Correa    Past Medical History:                 PAST MEDICAL HISTORY     UNREMARKABLE         GYNECOLOGICAL HISTORY:        Menopause at age 48.          PREVENTIVE HEALTH MAINTENANCE             COLORECTAL CANCER SCREENING: declines colorectal cancer screening, understands reason for testing     Hepatitis C Medicare Screening: was last done 19; negative     MAMMOGRAM: declines, understands reason for testing has never been done     PAP SMEAR: was last done  with normal results         Surgical History:     NONE         Family History:         Positive for Lymphoma ( father ).      Mother:  at age 75     Daughter(s): 2  daughter(s) total;  Type 2 Diabetes         Social History: [employer]     Occupation: a factory.   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      Caffeine:  She admits to consuming caffeine via coffee ( 1 serving per day ).          Substance Abuse History:     Last Reviewed on 1/08/2019 09:42 AM by Dorene Correa    NEGATIVE         Mental Health History:     Last Reviewed on 1/08/2019 09:42 AM by Doreen Correa        Communicable Diseases (eg STDs):     Last Reviewed on 1/08/2019 09:42 AM by Dorene Correa            Current Problems:     Last Reviewed on 1/08/2019 09:42 AM by Dorene Correa    Fatigue     Anxiety, generalized     Thyroid nodule     Cigarette smoking     Acute bronchitis         Immunizations:     Hep A, adult dose 1/8/2019     Tuberculosis test, intradermal 1/18/2007     Boostrix (Tdap) 1/8/2019     Tdap (Tetanus, reduced diph, acellular pertussis) 6/16/2009         Allergies:     Last Reviewed on 3/09/2019 11:11 AM by Maru Alonso    Erythromycin Base:        Current Medications:     Last Reviewed on 3/09/2019 11:12 AM by Maru Alonso    Ventolin HFA 90mcg/1actuation Oral Inhaler Inhale 2 puff(s) by mouth 4 times a day as needed         OBJECTIVE:        Vitals:         Current: 4/26/2019 11:54:32 AM    Ht:  5 ft, 2 in;  Wt: 129.2 lbs;  BMI: 23.6    T: 98.2 F (oral);  BP: 95/57 mm Hg (left arm, sitting);  P: 88 bpm (left arm (BP Cuff), sitting);  sCr: 0.64 mg/dL;  GFR: 91.36    O2 Sat: 95 % (room air)        Exams:     PHYSICAL EXAM:     GENERAL: well developed, well nourished;  no apparent distress;     EYES: PERRL, EOMI     E/N/T: EARS: external auditory canal normal;  both TMs are dull;  NOSE: normal turbinates; no sinus tenderness;  OROPHARYNX: oral mucosa is normal; posterior pharynx shows no exudate and post nasal drip;     NECK: range of motion is normal; trachea is midline;     RESPIRATORY: normal respiratory rate and pattern with no distress; normal breath sounds with no rales, rhonchi, wheezes or rubs;     CARDIOVASCULAR: normal rate; rhythm is regular;     NEUROLOGIC: mental status: alert and oriented x 3; GROSSLY INTACT     PSYCHIATRIC: appropriate affect and demeanor;         ASSESSMENT           466.0   J20.9  Acute bronchitis              DDx:     305.1   F17.200  Cigarette smoking              DDx:         ORDERS:         Meds Prescribed:       Prednisone 10mg Tablet take 6 pills today, 5 pills tomorrow, 4 pills day 3, 3 pills day 4, 2 pills day 5 and 1 pill day 6  #21 (Twenty One) tablet(s) Refills: 0       Tessalon Perles (Benzonatate) 100mg Capsules One PO Q 8 hours PRN cough  #30 (Thirty) capsule(s) Refills: 0       Fexofenadine HCl 180mg Tablet 1 tab daily  #30 (Thirty) tablet(s) Refills: 5       Refill of: Ventolin HFA (Albuterol) 90mcg/1actuation Oral Inhaler Inhale 2 puff(s) by mouth 4 times a day as needed  #1 (One) inhaler(s) Refills: 1         Other Orders:       [id number]  Noninvasive ear or pulse oximetry for oxygen saturation; single determination  (In-House)         4004F  Pt scrnd tobacco use rcvd tobacco cessation talk  (In-House)                   PLAN:          Acute bronchitis Occurs with season changes. Will start antihistamine. Advised to follow up if worsening or no improvement next week. Will consider xray as needed. Quit smoking. Discussed that we do not want to take oral steroids frequently unless needed.         RECOMMENDATIONS given include: Push Fluids, Rest, Follow up if no improvement or worsening symptoms like high fevers, vomiting, weakness, or increasing shortness of air.    .      FOLLOW-UP: Schedule follow-up appointments on a p.r.n. basis. Chronic visit follow up           Prescriptions:        Prednisone 10mg Tablet take 6 pills today, 5 pills tomorrow, 4 pills day 3, 3 pills day 4, 2 pills day 5 and 1 pill day 6  #21 (Twenty One) tablet(s) Refills: 0       Tessalon Perles (Benzonatate) 100mg Capsules One PO Q 8 hours PRN cough  #30 (Thirty) capsule(s) Refills: 0       Fexofenadine HCl 180mg Tablet 1 tab daily  #30 (Thirty) tablet(s) Refills: 5       Refill of: Ventolin HFA (Albuterol) 90mcg/1actuation Oral Inhaler Inhale 2 puff(s) by mouth 4 times a day as needed  #1 (One) inhaler(s) Refills: 1           Orders:       14679  Noninvasive ear or pulse oximetry for oxygen saturation; single determination  (In-House)            Cigarette smoking         RECOMMENDATIONS given include: Counseled on smoking cessation and advised of the benefits to patient's health if she were to stop smoking. Counseling for less than 3 minutes.            Orders:       4004F  Pt scrnd tobacco use rcvd tobacco cessation talk  (In-House)               Patient Recommendations:        For  Acute bronchitis:     Schedule follow-up appointments as needed.          For  Cigarette smoking:         Stop smoking.              CHARGE CAPTURE           **Please note: ICD descriptions below are intended for billing purposes only and may not represent clinical diagnoses**        Primary Diagnosis:         466.0 Acute bronchitis            J20.9    Acute bronchitis, unspecified              Orders:          90636   Office/outpatient visit; established patient, level 3  (In-House)             47086   Noninvasive ear or pulse oximetry for oxygen saturation; single determination  (In-House)           305.1 Cigarette smoking            F17.200    Nicotine dependence, unspecified, uncomplicated              Orders:          4004F   Pt scrnd tobacco use rcvd tobacco cessation talk  (In-House)

## 2021-06-05 NOTE — PROGRESS NOTES
"Lisa Luna  1965     Office/Outpatient Visit    Visit Date:  02:03 pm    Provider: Lelo Correa NGAUTAM (Assistant: Lauren Gonzalez MA)    Location: [facility]        Electronically signed by Lelo Correa N.P. on  2021 02:45:57 PM                             Subjective:        CC: Mrs. Luna is a 55 year old  female.  work physical; is testing regularly for covid at [employer], would like to know what herbs/vitamins can boost her immune system         HPI:           HEALTH RISK PROFILE (\"At Risk\" items are starred):     Smoking: ** Currently smokes ( 1-5 );     Cancer Screening: Current with screening mammograms;     Immunization Status: Up to date;     Nutrition: Healthy, well-balanced diet;     Physical Activity: Exercises at least 3 times per week;     Safety: Always wears seatbelt;     ROS:     CONSTITUTIONAL:  Positive for fatigue.   Negative for chills or fever.      EYES:  Negative for blurred vision.      E/N/T:  Negative for diminished hearing and nasal congestion.      CARDIOVASCULAR:  Negative for chest pain and palpitations.      RESPIRATORY:  Positive for wheeze at times, needs her inhaler refiled.   Negative for recent cough or dyspnea.      GASTROINTESTINAL:  Negative for abdominal pain, melena, nausea and vomiting.      MUSCULOSKELETAL:  Negative for arthralgias and myalgias.      INTEGUMENTARY/BREAST:  Positive for performance of self breast exams ( on a monthly basis ).   Negative for atypical mole(s), rash or breast mass.      NEUROLOGICAL:  Negative for paresthesias and weakness.      PSYCHIATRIC:  Negative for anxiety and depression.          Past Medical History / Family History / Social History:         Last Reviewed on 2021 02:20 PM by Lelo Correa    Past Medical History:                 PAST MEDICAL HISTORY     UNREMARKABLE         GYNECOLOGICAL HISTORY:        Menopause at age 48.          PREVENTIVE HEALTH " MAINTENANCE             COLORECTAL CANCER SCREENING: declines colorectal cancer screening, understands reason for testing     Hepatitis C Medicare Screening: was last done 19; negative     MAMMOGRAM: was last done 20 with normal results     PAP SMEAR: was last done  with normal results         Surgical History:     NONE         Family History:         Positive for Lymphoma ( father ).  Father:  at age 76;  lymphoma     Mother:  at age 75     Daughter(s): 2 daughter(s) total;  Type 2 Diabetes         Social History: [employer] [facility]     Occupation: a factory.   [employer] works second shift /also works [employer] part time     Marital Status:      Children: 2 children         Tobacco/Alcohol/Supplements:     Last Reviewed on 2021 02:06 PM by Lauren Gonzalez    Tobacco: Current Smoker: She currently smokes every day, 1-5 cigarettes per day.      Caffeine:  She admits to consuming caffeine via coffee ( 1 serving per day ).          Substance Abuse History:     Last Reviewed on 2019 09:42 AM by Dorene Correa    NEGATIVE         Mental Health History:     Last Reviewed on 2019 09:42 AM by Dorene Correa        Communicable Diseases (eg STDs):     Last Reviewed on 2019 09:42 AM by Dorene Correa        Immunizations:     Hep A, adult dose 2019    Tuberculosis test, intradermal 2007    Boostrix (Tdap) 2019    Tdap (Tetanus, reduced diph, acellular pertussis) 2009        Allergies:     Last Reviewed on 2021 02:06 PM by Lauren Gonzalez    Erythromycin Base:          Current Medications:     Last Reviewed on 2021 02:07 PM by Lauren Gonzalez    albuterol sulfate 90 mcg/actuation Inhalation HFA Aerosol Inhaler [INHALE 2 PUFFS BY MOUTH FOUR TIMES A DAY AS NEEDED]    fluticasone propion-salmeteroL 250-50 mcg/dose Inhalation Blister, With Inhalation Device [INHALE 1 PUFF BY MOUTH TWO TIMES A DAY]        Objective:        Vitals:         Current: 2021 2:09:36  PM    Ht:  5 ft, 2 in;  Wt: 133.2 lbs;  BMI: 24.4T: 98.7 F (temporal);  BP: 112/73 mm Hg (left arm, sitting);  P: 83 bpm (left arm (BP Cuff), sitting);  sCr: 0.54 mg/dL;  GFR: 108.46        Exams:     PHYSICAL EXAM:     GENERAL: vital signs recorded - well developed, well nourished;  no apparent distress;     EYES: extraocular movements intact; conjunctiva and cornea are normal; PERRLA;     E/N/T:  normal EACs, TMs, nasal/oral mucosa, teeth, gingiva, and oropharynx;     NECK: range of motion is normal; thyroid is non-palpable;     RESPIRATORY: normal respiratory rate and pattern with no distress; normal breath sounds with no rales, rhonchi, wheezes or rubs;     CARDIOVASCULAR: normal rate; rhythm is regular;  no systolic murmur; no edema;     GASTROINTESTINAL: nontender; normal bowel sounds; no organomegaly;     LYMPHATIC: no enlargement of cervical or facial nodes; no axillary adenopathy;     BREAST/INTEGUMENT: BREASTS: breast exam is normal without masses, skin changes, or nipple discharge; SKIN: no significant rashes or lesions; no suspicious moles;     MUSCULOSKELETAL:  Normal range of motion, strength and tone;     NEUROLOGIC: GROSSLY INTACT     PSYCHIATRIC:  appropriate affect and demeanor; normal speech pattern; grossly normal memory;         Assessment:         Z00.00   Encounter for general adult medical examination without abnormal findings       Z13.31   Encounter for screening for depression       J44.9   Chronic obstructive pulmonary disease, unspecified           ORDERS:         Meds Prescribed:       [Refilled] fluticasone propion-salmeteroL 250-50 mcg/dose Inhalation Blister, With Inhalation Device [INHALE 1 PUFF BY MOUTH TWO TIMES A DAY], #60 (sixty) blisters, Refills: 2 (two)       [Refilled] albuterol sulfate 90 mcg/actuation Inhalation HFA Aerosol Inhaler [INHALE 2 PUFFS BY MOUTH FOUR TIMES A DAY AS NEEDED], #18 (eighteen) each, Refills: 1 (one)         Radiology/Test Orders:       [id number]   Screening digital breast tomosynthesis bi  (Send-Out)              Lab Orders:       [id number]  PHYSF - [facility] PHYSICAL: CMP, CBC, TSH, LIPID: [id number], [id number], [id number]  (Send-Out)            80951  VITD - [facility] Vitamin D, 25 Hydroxy  (Send-Out)              Other Orders:         Depression screen negative  (In-House)                      Plan:         Encounter for general adult medical examination without abnormal findingsadvised vit C 1000 BID , vit D 2000 IU daily  and zinc 50 mg QD can be taken OTC     LABORATORY:  Labs ordered to be performed today include PHYSICAL PANEL; CMP, CBC, TSH, LIPID and Vitamin D.      RADIOLOGY:  I have ordered Mammogram Bilateral Screening 3D to be done today.      RECOMMENDATIONS given include: Screening Colonoscopy declines.      COUNSELING provided on: breast self-exam, healthy eating habits, regular exercise, and use of seat belts.      FOLLOW-UP: pending labs           Orders:       [id number]  PHYSF - [facility] PHYSICAL: CMP, CBC, TSH, LIPID: [id number], [id number], [id number], [id number]  (Send-Out)            86912  Screening digital breast tomosynthesis bi  (Send-Out)            95577  VITD - [facility] Vitamin D, 25 Hydroxy  (Send-Out)              Encounter for screening for depression    MIPS PHQ-9 Depression Screening: Completed form scanned and in chart; Total Score 3; Negative Depression Screen  Smoking cessation encouraged. Counseling for less than 3 minutes.            Orders:         Depression screen negative  (In-House)              Chronic obstructive pulmonary disease, unspecified          Prescriptions:       [Refilled] fluticasone propion-salmeteroL 250-50 mcg/dose Inhalation Blister, With Inhalation Device [INHALE 1 PUFF BY MOUTH TWO TIMES A DAY], #60 (sixty) blisters, Refills: 2 (two)       [Refilled] albuterol sulfate 90 mcg/actuation Inhalation HFA Aerosol Inhaler [INHALE 2 PUFFS BY MOUTH FOUR TIMES A DAY AS NEEDED], #18 (eighteen) each, Refills: 1 (one)             Patient  Recommendations:        For  Encounter for general adult medical examination without abnormal findings:        You should regularly examine your breasts, easily done while in the shower or with lotion.  Feel and look for differences in consistency from month to month, especially noting knots or lumps, changes in skin appearance, nipple retraction or discharge.    Limit dietary intake of fat (especially saturated fat) and cholesterol.  Eat a variety of foods, including plenty of fruits, vegetables, and grain containg fiber, limit fat intake to 30% of total calories. Balance caloric intake with energy expended.    Maintaining regular physical activity is advised to help prevent heart disease, hypertension, diabetes, and obesity.    Always use shoulder/lap restraints when driving or riding in a vehicle, even those equipped with air bags.              Charge Capture:         Primary Diagnosis:     Z00.00  Encounter for general adult medical examination without abnormal findings           Orders:      73715  Preventive medicine, established patient, age 40-64 years  (In-House)              Z13.31  Encounter for screening for depression           Orders:        Depression screen negative  (In-House)              J44.9  Chronic obstructive pulmonary disease, unspecified

## 2021-06-05 NOTE — PROGRESS NOTES
Lisa Luna 1965     Office/Outpatient Visit    Visit Date: Sat, Mar 9, 2019 11:09 am    Provider: Ness Rivera N.P. (Assistant: Maru Alonso RN)    Location: [facility]        Electronically signed by Ness Rivera N.P. on  2019 11:26:42 AM                             SUBJECTIVE:        CC:     Ms. Luna is a 53 year old  female.  Cough , SOA;         HPI:         Ms. Luna presents with upper respiratory illness.  These have been present for the past 2 weeks.  The symptoms include chest congestion, cough, wheezing and dyspnea.  She denies fever.  She has already tried to relieve the symptoms with Ventolin inhaler.  Medical history is significant for cigarette smoking.      ROS:     CONSTITUTIONAL:  Negative for chills and fever.      EYES:  Negative for eye drainage and eye pain.      E/N/T:  Positive for post nasal drip.   Negative for ear pain or sore throat.      CARDIOVASCULAR:  Negative for chest pain and palpitations.      RESPIRATORY:  Positive for recent cough, dyspnea and frequent wheezing.          PMH/FMH/SH:     Last Reviewed on 2019 09:50 AM by Lelo Correa    Past Medical History:                 PAST MEDICAL HISTORY     UNREMARKABLE         GYNECOLOGICAL HISTORY:        Menopause at age 48.          PREVENTIVE HEALTH MAINTENANCE             COLORECTAL CANCER SCREENING: declines colorectal cancer screening, understands reason for testing     Hepatitis C Medicare Screening: was last done 19; negative     MAMMOGRAM: declines, understands reason for testing has never been done     PAP SMEAR: was last done  with normal results         Surgical History:     NONE         Family History:         Positive for Lymphoma ( father ).      Mother:  at age 75     Daughter(s): 2 daughter(s) total;  Type 2 Diabetes         Social History: [employer]     Occupation: a [employer].   [employer] works third shift /also works [employer] part time     Marital  Status:      Children: 2 children         Tobacco/Alcohol/Supplements:     Last Reviewed on 1/08/2019 09:43 AM by Dorene Correa    Tobacco: Current Smoker: She currently smokes every day, 1-5 cigarettes per day.      Caffeine:  She admits to consuming caffeine via coffee ( 1 serving per day ).          Substance Abuse History:     Last Reviewed on 1/08/2019 09:42 AM by Dorene Correa    NEGATIVE         Mental Health History:     Last Reviewed on 1/08/2019 09:42 AM by Dorene Correa        Communicable Diseases (eg STDs):     Last Reviewed on 1/08/2019 09:42 AM by Dorene Correa            Current Problems:     Last Reviewed on 1/08/2019 09:42 AM by Dorene Correa    Fatigue     Anxiety, generalized     Thyroid nodule         Immunizations:     Hep A, adult dose 1/8/2019     Tuberculosis test, intradermal 1/18/2007     Boostrix (Tdap) 1/8/2019     Tdap (Tetanus, reduced diph, acellular pertussis) 6/16/2009         Allergies:     Last Reviewed on 3/09/2019 11:11 AM by Maru Alonso    Erythromycin Base:        Current Medications:     Last Reviewed on 3/09/2019 11:12 AM by Maru Alonso    Ventolin HFA 90mcg/1actuation Oral Inhaler Inhale 2 puff(s) by mouth 4 times a day as needed         OBJECTIVE:        Vitals:         Current: 3/9/2019 11:13:15 AM    Ht:  5 ft, 2 in;  Wt: 128.6 lbs;  BMI: 23.5    T: 98.1 F (oral);  BP: 114/76 mm Hg (left arm, sitting);  P: 80 bpm (left arm (BP Cuff), sitting);  sCr: 0.64 mg/dL;  GFR: 92.21    O2 Sat: 98 % (room air)        Exams:     PHYSICAL EXAM:     GENERAL: well developed, well nourished;  no apparent distress;     EYES: PERRL, EOMI     E/N/T: EARS: external auditory canal normal;  both TMs are dull;  NOSE: normal turbinates; no sinus tenderness; OROPHARYNX: oral mucosa is normal; posterior pharynx shows no exudate and post nasal drip;     NECK: range of motion is normal; trachea is midline;     RESPIRATORY: normal respiratory rate and pattern with no  distress; normal breath sounds with no rales, rhonchi, wheezes or rubs;     CARDIOVASCULAR: normal rate; rhythm is regular;     MUSCULOSKELETAL: normal gait;     NEUROLOGIC: mental status: alert and oriented x 3; GROSSLY INTACT     PSYCHIATRIC: appropriate affect and demeanor;         ASSESSMENT           466.0   J20.9  Acute bronchitis              DDx:     305.1   F17.200  Cigarette smoking              DDx:         ORDERS:         Meds Prescribed:       Refill of: Ventolin HFA (Albuterol) 90mcg/1actuation Oral Inhaler Inhale 2 puff(s) by mouth 4 times a day as needed  #1 (One) inhaler(s) Refills: 0       Prednisone 10mg Tablet take 6 pills today, 5 pills tomorrow, 4 pills day 3, 3 pills day 4, 2 pills day 5 and 1 pill day 6  #21 (Twenty One) tablet(s) Refills: 0       Tessalon Perles (Benzonatate) 100mg Capsules One PO Q 8 hours PRN cough  #30 (Thirty) capsule(s) Refills: 0         Other Orders:       [id number]  Noninvasive ear or pulse oximetry for oxygen saturation; single determination  (In-House)         4004F  Pt scrnd tobacco use rcvd tobacco cessation talk  (In-House)                   PLAN:          Acute bronchitis         RECOMMENDATIONS given include: Push Fluids, Rest, Follow up if no improvement or worsening symptoms like high fevers, vomiting, weakness, or increasing shortness of air.    .      FOLLOW-UP: Schedule follow-up appointments on a p.r.n. basis. Chronic visit follow up           Prescriptions:       Refill of: Ventolin HFA (Albuterol) 90mcg/1actuation Oral Inhaler Inhale 2 puff(s) by mouth 4 times a day as needed  #1 (One) inhaler(s) Refills: 0       Prednisone 10mg Tablet take 6 pills today, 5 pills tomorrow, 4 pills day 3, 3 pills day 4, 2 pills day 5 and 1 pill day 6  #21 (Twenty One) tablet(s) Refills: 0       Tessalon Perles (Benzonatate) 100mg Capsules One PO Q 8 hours PRN cough  #30 (Thirty) capsule(s) Refills: 0           Orders:       05040  Noninvasive ear or pulse oximetry for  oxygen saturation; single determination  (In-House)            Cigarette smoking         RECOMMENDATIONS given include: Counseled on smoking cessation and advised of the benefits to patient's health if she were to stop smoking. Counseling for less than 3 minutes.            Orders:       4004F  Pt scrnd tobacco use rcvd tobacco cessation talk  (In-House)               Patient Recommendations:        For  Acute bronchitis:     Schedule follow-up appointments as needed.          For  Cigarette smoking:         Stop smoking.              CHARGE CAPTURE           **Please note: ICD descriptions below are intended for billing purposes only and may not represent clinical diagnoses**        Primary Diagnosis:         466.0 Acute bronchitis            J20.9    Acute bronchitis, unspecified              Orders:          47039   Office/outpatient visit; established patient, level 3  (In-House)             66315   Noninvasive ear or pulse oximetry for oxygen saturation; single determination  (In-House)           305.1 Cigarette smoking            F17.200    Nicotine dependence, unspecified, uncomplicated              Orders:          4004F   Pt scrnd tobacco use rcvd tobacco cessation talk  (In-House)

## 2021-06-05 NOTE — PROGRESS NOTES
"Lisa Luna  1965     Office/Outpatient Visit    Visit Date:  11:05 am    Provider: Lelo Correa N.P. (Assistant: Michelle Cristina LPN)    Location: [facility]        Electronically signed by Lelo Correa N.P. on  2020 12:56:50 PM                             Subjective:        CC: Mrs. Luna is a 54 year old  female.  She is here for physical.          HPI:           HEALTH RISK PROFILE (\"At Risk\" items are starred):     Smoking: ** Currently smokes ( smokes 1-5 per day );     Cancer Screening: ** Not current with screening mammograms;     Nutrition: Healthy, well-balanced diet;     Physical Activity: ** Exercises infrequently;     Safety: Always wears seatbelt;     ROS:     CONSTITUTIONAL:  Positive for fatigue ( works alot ).   Negative for chills or fever.      EYES:  Negative for blurred vision.      E/N/T:  Negative for diminished hearing and nasal congestion.      CARDIOVASCULAR:  Negative for chest pain and palpitations.      RESPIRATORY:  Negative for recent cough and dyspnea.  would like a refill of her inhaler to use prn     GASTROINTESTINAL:  Positive for rectal itching, thinks may be related to heavy lifting.   Negative for abdominal pain, constipation, diarrhea, nausea or vomiting.      MUSCULOSKELETAL:  Negative for arthralgias and myalgias.      INTEGUMENTARY/BREAST:  Positive for performance of self breast exams ( on a monthly basis ) and chronic skin lesions / sores on her lower legs.   Negative for atypical mole(s) or breast mass.      NEUROLOGICAL:  Negative for paresthesias and weakness.      PSYCHIATRIC:  Negative for anxiety and depression.          Past Medical History / Family History / Social History:         Last Reviewed on 2020 11:10 AM by Lelo Correa    Past Medical History:                 PAST MEDICAL HISTORY     UNREMARKABLE         GYNECOLOGICAL HISTORY:        Menopause at age 48.          " PREVENTIVE HEALTH MAINTENANCE             COLORECTAL CANCER SCREENING: declines colorectal cancer screening, understands reason for testing     Hepatitis C Medicare Screening: was last done 19; negative     MAMMOGRAM: declines, understands reason for testing has never been done     PAP SMEAR: was last done  with normal results         Surgical History:     NONE         Family History:         Positive for Lymphoma ( father ).  Father:  at age 76;  lymphoma     Mother:  at age 75     Daughter(s): 2 daughter(s) total;  Type 2 Diabetes         Social History: [facility]     Occupation: a factory.   [employer] works second shift /also works [employer] part time     Marital Status:      Children: 2 children         Tobacco/Alcohol/Supplements:     Last Reviewed on 2020 11:05 AM by Michelle Cristina    Tobacco: Current Smoker: She currently smokes every day, 1-5 cigarettes per day.      Caffeine:  She admits to consuming caffeine via coffee ( 1 serving per day ).          Substance Abuse History:     Last Reviewed on 2019 09:42 AM by Dorene Correa    NEGATIVE         Mental Health History:     Last Reviewed on 2019 09:42 AM by Dorene Correa        Communicable Diseases (eg STDs):     Last Reviewed on 2019 09:42 AM by Dorene Correa        Immunizations:     Hep A, adult dose 2019    Tuberculosis test, intradermal 2007    Boostrix (Tdap) 2019    Tdap (Tetanus, reduced diph, acellular pertussis) 2009        Allergies:     Last Reviewed on 2020 11:05 AM by Michelle Cristina    Erythromycin Base:          Current Medications:     Last Reviewed on 2020 11:05 AM by Michelle Cristina    Ventolin HFA 90mcg/1actuation Oral Inhaler [Inhale 2 puff(s) by mouth 4 times a day as needed]        Objective:        Vitals:         Current: 2020 11:08:06 AM    Ht:  5 ft, 2 in;  Wt: 130.6 lbs;  BMI: 23.9T: 98.8 F (oral);  BP: 135/89 mm Hg (right arm,  sitting);  P: 71 bpm (right arm (BP Cuff), sitting);  sCr: 0.54 mg/dL;  GFR: 108.78        Exams:     PHYSICAL EXAM:     GENERAL: vital signs recorded - well developed, well nourished;  no apparent distress;     EYES: extraocular movements intact; conjunctiva and cornea are normal; PERRLA;     E/N/T:  normal EACs, TMs, nasal/oral mucosa, teeth, gingiva, and oropharynx;     NECK: range of motion is normal; thyroid is non-palpable;     RESPIRATORY: normal respiratory rate and pattern with no distress; normal breath sounds with no rales, rhonchi, wheezes or rubs;     CARDIOVASCULAR: normal rate; rhythm is regular;  no systolic murmur; no edema;     GASTROINTESTINAL: nontender; normal bowel sounds; no organomegaly; rectal exam: no skin lesions in perirectal area;     LYMPHATIC: no enlargement of cervical or facial nodes; no axillary adenopathy;     BREAST/INTEGUMENT: BREASTS: breast exam is normal without masses, skin changes, or nipple discharge; several superficial abraded areas on lower leg, no exudate or induraction;     MUSCULOSKELETAL:  Normal range of motion, strength and tone;     NEUROLOGIC: GROSSLY INTACT     PSYCHIATRIC:  appropriate affect and demeanor; normal speech pattern; grossly normal memory;         Assessment:         Z00.00   Encounter for general adult medical examination without abnormal findings       R21   Rash and other nonspecific skin eruption           ORDERS:         Meds Prescribed:       [Refilled] Ventolin HFA 90 mcg/actuation Inhalation HFA Aerosol Inhaler [Inhale 2 puff(s) by mouth 4 times a day as needed], #1 (one) each, Refills: 1 (one)       [New Rx] Anusol-HC 2.5 % Topical cream with perineal applicator [apply a thin layer to the affected area(s) by topical route 2 times per day], #30 (thirty) grams, Refills: 0 (zero)         Radiology/Test Orders:       [id number]  Screening digital breast tomosynthesis bi  (Send-Out)              Procedures Ordered:       REFER  Referral to Specialist  or Other Facility  (Send-Out)                      Plan:         Encounter for general adult medical examination without abnormal findingsreviewed labs done last month         RADIOLOGY:  I have ordered Mammogram Bilateral Screening 3D to be done today.  MIPS Smoking cessation encouraged. Counseling for less than 3 minutes.      COUNSELING provided on: breast self-exam, healthy eating habits, tobacco avoidance, and use of seat belts.      COUNSELING was provided today regarding the following topics: discussesd healthy eating, to take multi vit with iron daily.            Orders:       [id number]  Screening digital breast tomosynthesis bi  (Send-Out)              Rash and other nonspecific skin eruptiontrial of anusol for her rectal itching, if this persist follow up, declines colon screening         REFERRALS:  Referral initiated to a dermatologist ( Dr. Maria Luisa Parada; for evaluation of chronic rash on her leg/sores ).            Prescriptions:       [New Rx] Anusol-HC 2.5 % Topical cream with perineal applicator [apply a thin layer to the affected area(s) by topical route 2 times per day], #30 (thirty) grams, Refills: 0 (zero)           Orders:       REFER  Referral to Specialist or Other Facility  (Send-Out)                  Other Prescriptions:       [Refilled] Ventolin HFA 90 mcg/actuation Inhalation HFA Aerosol Inhaler [Inhale 2 puff(s) by mouth 4 times a day as needed], #1 (one) each, Refills: 1 (one)         Patient Recommendations:        For  Encounter for general adult medical examination without abnormal findings:        You should regularly examine your breasts, easily done while in the shower or with lotion.  Feel and look for differences in consistency from month to month, especially noting knots or lumps, changes in skin appearance, nipple retraction or discharge.    Limit dietary intake of fat (especially saturated fat) and cholesterol.  Eat a variety of foods, including plenty of fruits, vegetables, and  grain containg fiber, limit fat intake to 30% of total calories. Balance caloric intake with energy expended.    Avoid using tobacco products.  If you already use tobacco, it is recommended that you stop.  A variety of methods have been shown to be effective in smoking cessation.    Always use shoulder/lap restraints when driving or riding in a vehicle, even those equipped with air bags.              Charge Capture:         Primary Diagnosis:     Z00.00  Encounter for general adult medical examination without abnormal findings           Orders:      [id number]  Preventive medicine, established patient, age 40-64 years  (In-House)              R21  Rash and other nonspecific skin eruption

## 2021-06-05 NOTE — PROGRESS NOTES
Lisa Luna 1965     Office/Outpatient Visit    Visit Date: Fri, Feb 2, 2018 11:38 am    Provider: Ness Rivera N.P. (Assistant: Maru Alonso RN)    Location: [facility]        Electronically signed by Ness Rivera N.P. on  02/04/2018 10:41:04 AM                             SUBJECTIVE:        CC:     Ms. Luna is a 52 year old  female.  Well Woman Exam;         HPI:         Patient complains of well Woman Exam.  She cannot recall when she last had a physical exam.  She cannot recall the date of her last menstrual period.  She is not currently using any form of contraception.  She performs breast self-exams occasionally.    Her last Pap smear was Approx 5 years ago  (with Maru Lopez) years ago and was normal.   She has never had a mammogram. She has never had a dexa scan. Preventative Health updated today.  Ms. Luna denies any history of abnormal Pap smears.  Tobacco: Current Smoker: She currently smokes every day, 1-5 cigarettes per day.          ROS:     CONSTITUTIONAL:  Negative for chills, fatigue, fever, and weight change.      EYES:  Negative for blurred vision, eye pain, and photophobia.      E/N/T:  Negative for hearing problems, E/N/T pain, congestion, rhinorrhea, epistaxis, hoarseness, and dental problems.      CARDIOVASCULAR:  Negative for chest pain, palpitations, tachycardia, orthopnea, and edema.      RESPIRATORY:  Negative for cough, dyspnea, and hemoptysis.      GASTROINTESTINAL:  Negative for abdominal pain, heartburn, constipation, diarrhea, and stool changes.      GENITOURINARY:  Negative for genital lesions, hematuria, menstrual problems, polyuria, abnormal vaginal bleeding, and vaginal discharge.      MUSCULOSKELETAL:  Negative for arthralgias, back pain, and myalgias.      INTEGUMENTARY/BREAST:  Negative for atypical moles, dry skin, pruritis, rashes, breast masses, and nipple discharge.      NEUROLOGICAL:  Negative for dizziness, headaches, paresthesias,  and weakness.      HEMATOLOGIC/LYMPHATIC:  Negative for easy bruising, bleeding, and lymphadenopathy.      ENDOCRINE:  Negative for hair loss, heat/cold intolerance, polydipsia, and polyphagia.      ALLERGIC/IMMUNOLOGIC:  Negative for allergies, frequent illnesses, HIV exposure, and urticaria.      PSYCHIATRIC:  Negative for anxiety, depression, and sleep disturbances.          PMH/FMH/SH:     Last Reviewed on 2018 12:11 PM by Ness Rivera    Past Medical History:                 PAST MEDICAL HISTORY     UNREMARKABLE         GYNECOLOGICAL HISTORY:        Menopause at age 48.          PREVENTIVE HEALTH MAINTENANCE             COLORECTAL CANCER SCREENING: declines colorectal cancer screening, understands reason for testing     MAMMOGRAM: declines, understands reason for testing has never been done     PAP SMEAR: was last done  with normal results Dr. Lopez         Surgical History:     NONE         Family History:         Positive for Lymphoma ( father ).      Mother:  at age 75         Social History:     Occupation: a factory.   [employer]     Marital Status:      Children: 2 children         Tobacco/Alcohol/Supplements:     Last Reviewed on 2018 12:11 PM by Ness Rivera    Tobacco: Current Smoker: She currently smokes every day, 1-5 cigarettes per day.      Caffeine:  She admits to consuming caffeine via coffee ( 1 serving per day ).          Substance Abuse History:     Last Reviewed on 2018 12:11 PM by Ness Rivera    NEGATIVE         Mental Health History:     Last Reviewed on 2018 12:11 PM by Ness Rivera        Communicable Diseases (eg STDs):     Last Reviewed on 2018 12:11 PM by Ness Rivera            Current Problems:     Last Reviewed on 2018 12:11 PM by Ness Rivera    Fatigue     Anxiety, generalized     Thyroid nodule     Screening for breast cancer, unspecified         Immunizations:     Tuberculosis test, intradermal 2007     Tdap (Tetanus, reduced  diph, acellular pertussis) 6/16/2009         Allergies:     Last Reviewed on 2/02/2018 12:11 PM by Ness Rivera    Erythromycin Base:        Current Medications:     Last Reviewed on 2/02/2018 12:11 PM by Ness Rivera    Ventolin HFA 90mcg/1actuation Oral Inhaler Inhale 2 puff(s) by mouth 4 times a day as needed         OBJECTIVE:        Vitals:         Current: 2/2/2018 11:42:16 AM    Ht:  5 ft, 2 in;  Wt: 133.9 lbs;  BMI: 24.5    T: 96.7 F (oral);  BP: 124/77 mm Hg (left arm, sitting);  P: 77 bpm (left arm (BP Cuff), sitting)        Exams:     PHYSICAL EXAM:     GENERAL: Check here if Vitals recorded well developed, well nourished;  no apparent distress;     EYES: extraocular movements intact; conjunctiva and cornea are normal; PERRLA;     E/N/T:  normal EACs, TMs, nasal/oral mucosa, teeth, gingiva, and oropharynx;     NECK: range of motion is normal; thyroid is non-palpable;     RESPIRATORY: normal respiratory rate and pattern with no distress; normal breath sounds with no rales, rhonchi, wheezes or rubs;     CARDIOVASCULAR: normal rate; rhythm is regular;  no systolic murmur; no edema;     GASTROINTESTINAL: nontender; normal bowel sounds; no organomegaly;     GENITOURINARY:  normal appearance of external genitalia, urethra, and cervix; no cervical motion tenderness; no adnexal tenderness or masses on bimanual exam;     LYMPHATIC: no enlargement of cervical or facial nodes; no supraclavicular nodes;     BREAST/INTEGUMENT: BREASTS: breast exam is normal without masses, skin changes, or nipple discharge; SKIN: no significant rashes or lesions; no suspicious moles;     MUSCULOSKELETAL:  Normal range of motion, strength and tone;     NEUROLOGICAL:  cranial nerves, motor and sensory function, reflexes, gait and coordination are all intact;     PSYCHIATRIC:  appropriate affect and demeanor; normal speech pattern; grossly normal memory;         ASSESSMENT:           V72.31     Well Woman Exam     V76.10   Z12.39   Screening for breast cancer, unspecified              DDx:         ORDERS:         Radiology/Test Orders:       [id number]  Screening mammography bi 2-view inc CAD  (Send-Out)           Lab Orders:       [id number]  Cytopathology, slides, cervical or vaginal; manual screening under MD supervision  (Send-Out)         03513  [facility] PHYSICAL: CMP, CBC, TSH, LIPID: [id number], [id number], [id number], [id number]  (Send-Out)           Procedures Ordered:       [id number]  Handlg&/or convey of spec for TR office to lab  (In-House)                   PLAN:          Well Woman Exam She declines colorectal screening. Understands reason for testing.     LABORATORY:  Labs ordered to be performed today include PHYSICAL PANEL; CMP, CBC, TSH, LIPID.      RECOMMENDATIONS given include: Further recommendation to be given after test results are complete.      FOLLOW-UP: Schedule a follow-up visit in 12 months.      COUNSELING was provided today regarding the following topics: healthy eating habits, regular exercise, and breast self-exam.            Orders:      [id number]  Cytopathology, slides, cervical or vaginal; manual screening under MD supervision  (Send-Out)         99173  [facility] PHYSICAL: CMP, CBC, TSH, LIPID: [id number], [id number], [id number], [id number]  (Send-Out)         65502  Handlg&/or convey of spec for TR office to lab  (In-House)            Screening for breast cancer, unspecified         RADIOLOGY:  I have ordered screening mammogram to be done today.            Orders:       [id number]  Screening mammography bi 2-view inc CAD  (Send-Out)               Patient Recommendations:        For  Well Woman Exam:         Limit dietary intake of fat (especially saturated fat) and cholesterol.  Eat a variety of foods, including plenty of fruits, vegetables, and grain containg fiber, limit fat intake to 30% of total calories. Balance caloric intake with energy expended.    Maintaining regular physical activity is advised to help prevent heart disease, hypertension, diabetes, and  obesity.    You should regularly examine your breasts, easily done while in the shower or with lotion.  Feel and look for differences in consistency from month to month, especially noting knots or lumps, changes in skin appearance, nipple retraction or discharge.  Schedule a follow-up visit in 12 months.              CHARGE CAPTURE:           Primary Diagnosis:     V72.31    Well Woman Exam                Z01.419    Encounter for gynecological examination (general) (routine) without abnormal findings                       Orders:          [id number]   Preventive medicine, established patient, age 40-64 years  (In-House)             33316   Handlg&/or convey of spec for TR office to lab  (In-House)           V76.10 Screening for breast cancer, unspecified            Z12.39    Encounter for other screening for malignant neoplasm of breast

## 2021-06-30 RX ORDER — ALBUTEROL SULFATE 90 UG/1
2 AEROSOL, METERED RESPIRATORY (INHALATION) 4 TIMES DAILY PRN
COMMUNITY
End: 2021-07-01 | Stop reason: SDUPTHER

## 2021-07-01 VITALS
HEART RATE: 81 BPM | DIASTOLIC BLOOD PRESSURE: 79 MMHG | WEIGHT: 130.2 LBS | BODY MASS INDEX: 23.96 KG/M2 | SYSTOLIC BLOOD PRESSURE: 114 MMHG | OXYGEN SATURATION: 95 % | TEMPERATURE: 98.2 F | HEIGHT: 62 IN

## 2021-07-01 VITALS
HEIGHT: 62 IN | TEMPERATURE: 98.3 F | WEIGHT: 129.6 LBS | HEART RATE: 80 BPM | BODY MASS INDEX: 23.85 KG/M2 | SYSTOLIC BLOOD PRESSURE: 131 MMHG | DIASTOLIC BLOOD PRESSURE: 78 MMHG

## 2021-07-01 VITALS
HEIGHT: 62 IN | WEIGHT: 133.9 LBS | DIASTOLIC BLOOD PRESSURE: 77 MMHG | TEMPERATURE: 96.7 F | BODY MASS INDEX: 24.64 KG/M2 | SYSTOLIC BLOOD PRESSURE: 124 MMHG | HEART RATE: 77 BPM

## 2021-07-01 VITALS
HEIGHT: 62 IN | DIASTOLIC BLOOD PRESSURE: 57 MMHG | SYSTOLIC BLOOD PRESSURE: 95 MMHG | BODY MASS INDEX: 23.77 KG/M2 | OXYGEN SATURATION: 95 % | TEMPERATURE: 98.2 F | HEART RATE: 88 BPM | WEIGHT: 129.2 LBS

## 2021-07-01 VITALS
HEART RATE: 81 BPM | TEMPERATURE: 97.8 F | DIASTOLIC BLOOD PRESSURE: 78 MMHG | SYSTOLIC BLOOD PRESSURE: 123 MMHG | HEIGHT: 62 IN | WEIGHT: 130.8 LBS | BODY MASS INDEX: 24.07 KG/M2 | OXYGEN SATURATION: 100 %

## 2021-07-01 VITALS
OXYGEN SATURATION: 94 % | HEART RATE: 85 BPM | WEIGHT: 126.8 LBS | SYSTOLIC BLOOD PRESSURE: 117 MMHG | DIASTOLIC BLOOD PRESSURE: 81 MMHG | BODY MASS INDEX: 23.34 KG/M2 | HEIGHT: 62 IN | TEMPERATURE: 97.5 F

## 2021-07-01 VITALS
HEART RATE: 80 BPM | SYSTOLIC BLOOD PRESSURE: 114 MMHG | OXYGEN SATURATION: 98 % | HEIGHT: 62 IN | TEMPERATURE: 98.1 F | DIASTOLIC BLOOD PRESSURE: 76 MMHG | WEIGHT: 128.6 LBS | BODY MASS INDEX: 23.66 KG/M2

## 2021-07-01 NOTE — TELEPHONE ENCOUNTER
[facility] requesting refills on Albuterol sulfate HFA 9mcg two puffs qid prn. LV- 01/25/21, LF- 05/28/21, #1. Advair 250/50, one puff bid. - 05/28/21, #60

## 2021-07-02 VITALS
HEART RATE: 71 BPM | WEIGHT: 130.6 LBS | DIASTOLIC BLOOD PRESSURE: 89 MMHG | BODY MASS INDEX: 24.03 KG/M2 | SYSTOLIC BLOOD PRESSURE: 135 MMHG | TEMPERATURE: 98.8 F | HEIGHT: 62 IN

## 2021-07-02 VITALS
WEIGHT: 128.7 LBS | BODY MASS INDEX: 23.68 KG/M2 | HEART RATE: 69 BPM | SYSTOLIC BLOOD PRESSURE: 114 MMHG | HEIGHT: 62 IN | TEMPERATURE: 96.9 F | DIASTOLIC BLOOD PRESSURE: 69 MMHG

## 2021-07-02 VITALS
TEMPERATURE: 98.7 F | HEIGHT: 62 IN | SYSTOLIC BLOOD PRESSURE: 112 MMHG | DIASTOLIC BLOOD PRESSURE: 73 MMHG | BODY MASS INDEX: 24.51 KG/M2 | HEART RATE: 83 BPM | WEIGHT: 133.2 LBS

## 2021-07-03 RX ORDER — ALBUTEROL SULFATE 90 UG/1
2 AEROSOL, METERED RESPIRATORY (INHALATION)
Qty: 6.7 G | Refills: 0 | Status: SHIPPED | OUTPATIENT
Start: 2021-07-03 | End: 2021-08-17 | Stop reason: SDUPTHER

## 2021-08-18 RX ORDER — ALBUTEROL SULFATE 90 UG/1
2 AEROSOL, METERED RESPIRATORY (INHALATION)
Qty: 6.7 G | Refills: 0 | Status: SHIPPED | OUTPATIENT
Start: 2021-08-18 | End: 2021-10-29 | Stop reason: SDUPTHER

## 2021-10-29 DIAGNOSIS — J44.9 CHRONIC OBSTRUCTIVE PULMONARY DISEASE, UNSPECIFIED COPD TYPE (HCC): Primary | ICD-10-CM

## 2021-11-01 ENCOUNTER — OFFICE VISIT (OUTPATIENT)
Dept: FAMILY MEDICINE CLINIC | Age: 56
End: 2021-11-01

## 2021-11-01 ENCOUNTER — HOSPITAL ENCOUNTER (OUTPATIENT)
Dept: GENERAL RADIOLOGY | Facility: HOSPITAL | Age: 56
Discharge: HOME | End: 2021-11-01
Admitting: NURSE PRACTITIONER

## 2021-11-01 VITALS
SYSTOLIC BLOOD PRESSURE: 131 MMHG | DIASTOLIC BLOOD PRESSURE: 79 MMHG | BODY MASS INDEX: 23.63 KG/M2 | TEMPERATURE: 98.2 F | WEIGHT: 128.4 LBS | HEIGHT: 62 IN | HEART RATE: 92 BPM

## 2021-11-01 DIAGNOSIS — J40 BRONCHITIS: Primary | ICD-10-CM

## 2021-11-01 DIAGNOSIS — J44.9 CHRONIC OBSTRUCTIVE PULMONARY DISEASE, UNSPECIFIED COPD TYPE (HCC): ICD-10-CM

## 2021-11-01 DIAGNOSIS — R06.02 SOB (SHORTNESS OF BREATH): ICD-10-CM

## 2021-11-01 LAB
EXPIRATION DATE: NORMAL
FLUAV AG UPPER RESP QL IA.RAPID: NOT DETECTED
FLUBV AG UPPER RESP QL IA.RAPID: NOT DETECTED
INTERNAL CONTROL: NORMAL
Lab: NORMAL
SARS-COV-2 AG UPPER RESP QL IA.RAPID: NOT DETECTED

## 2021-11-01 PROCEDURE — 99213 OFFICE O/P EST LOW 20 MIN: CPT | Performed by: NURSE PRACTITIONER

## 2021-11-01 PROCEDURE — 87428 SARSCOV & INF VIR A&B AG IA: CPT | Performed by: NURSE PRACTITIONER

## 2021-11-01 PROCEDURE — 71046 X-RAY EXAM CHEST 2 VIEWS: CPT

## 2021-11-01 RX ORDER — ALBUTEROL SULFATE 90 UG/1
2 AEROSOL, METERED RESPIRATORY (INHALATION)
Qty: 6.7 G | Refills: 1 | Status: SHIPPED | OUTPATIENT
Start: 2021-11-01 | End: 2022-01-23

## 2021-11-01 RX ORDER — ALBUTEROL SULFATE 90 UG/1
2 AEROSOL, METERED RESPIRATORY (INHALATION)
Qty: 6.7 G | Refills: 1 | Status: SHIPPED | OUTPATIENT
Start: 2021-11-01 | End: 2021-11-01 | Stop reason: SDUPTHER

## 2021-11-01 RX ORDER — GUAIFENESIN DEXTROMETHORPHAN HYDROBROMIDE ORAL SOLUTION 10; 100 MG/5ML; MG/5ML
10 SOLUTION ORAL EVERY 4 HOURS PRN
Qty: 420 ML | Refills: 0 | Status: SHIPPED | OUTPATIENT
Start: 2021-11-01 | End: 2022-01-25

## 2021-11-01 NOTE — PROGRESS NOTES
CXR shows probable bronchitis, I sent in a cough medication with mucinex in it. Take 5ml every 4 hours as needed. Highly encourage no smoking. If symptoms persist, will recommend pulmonology referral.

## 2021-11-01 NOTE — PROGRESS NOTES
"Chief Complaint  Cough (sypmtoms since friday ), Shortness of Breath, and Headache    Subjective    Patient is a 56 year old female in today with complaints of increased shortness of breath, cough and headache that began on Friday. Patient reports getting short of breath walking from her car into work. Denies fever.          Lisa Luna presents to [facility]  Cough  This is a new problem. The current episode started in the past 7 days. The problem has been unchanged. The problem occurs every few hours. The cough is productive of sputum. Associated symptoms include chest pain, headaches and shortness of breath. Pertinent negatives include no chills or fever. The symptoms are aggravated by exercise and stress. Risk factors for lung disease include smoking/tobacco exposure. She has tried ipratropium inhaler for the symptoms. The treatment provided no relief.   Shortness of Breath  This is a new problem. The current episode started in the past 7 days. The problem occurs intermittently. Associated symptoms include chest pain and headaches. Pertinent negatives include no fever. The symptoms are aggravated by URIs and exercise. Risk factors include smoking. She has tried ipratropium inhalers for the symptoms. The treatment provided mild relief.   Headache   This is a new problem. The current episode started in the past 7 days. The problem occurs intermittently. The problem has been waxing and waning. The pain quality is similar to prior headaches. Associated symptoms include coughing. Pertinent negatives include no fever.       Objective   Vital Signs:   /79 (BP Location: Right arm, Patient Position: Sitting)   Pulse 92   Temp 98.2 °F (36.8 °C) (Oral)   Ht 157.5 cm (62\")   Wt 58.2 kg (128 lb 6.4 oz)   BMI 23.48 kg/m²     Physical Exam  Constitutional:       Appearance: She is not ill-appearing.   HENT:      Head: Normocephalic.      Right Ear: Tympanic membrane, ear canal and " external ear normal.      Left Ear: Tympanic membrane, ear canal and external ear normal.      Nose: Nose normal.      Mouth/Throat:      Mouth: Mucous membranes are moist.      Pharynx: Oropharynx is clear.   Cardiovascular:      Rate and Rhythm: Normal rate and regular rhythm.      Pulses: Normal pulses.      Heart sounds: Normal heart sounds.   Pulmonary:      Effort: Pulmonary effort is normal. No respiratory distress.      Breath sounds: No stridor. Wheezing and rhonchi present. No rales.   Skin:     General: Skin is warm and dry.   Neurological:      Mental Status: She is alert and oriented to person, place, and time.   Psychiatric:         Mood and Affect: Mood normal.        Result Review :                 Assessment and Plan    Diagnoses and all orders for this visit:    1. Bronchitis (Primary)  -     dextromethorphan-guaifenesin (ROBITUSSIN-DM)  MG/5ML liquid; Take 10 mL by mouth Every 4 (Four) Hours As Needed (Cough).  Dispense: 420 mL; Refill: 0    2. Chronic obstructive pulmonary disease, unspecified COPD type (HCC)  -     albuterol sulfate  (90 Base) MCG/ACT inhaler; Inhale 2 puffs by mouth 4 (Four) Times a Day  Dispense: 6.7 g; Refill: 1    3. SOB (shortness of breath)  -     POCT SARS-CoV-2 Antigen JOSE D + Flu  -     XR Chest PA & Lateral; Future  -     albuterol sulfate  (90 Base) MCG/ACT inhaler; Inhale 2 puffs by mouth 4 (Four) Times a Day  Dispense: 6.7 g; Refill: 1        Follow Up   Return if symptoms worsen or fail to improve.  Patient was given instructions and counseling regarding her condition or for health maintenance advice. Please see specific information pulled into the AVS if appropriate.

## 2021-12-27 DIAGNOSIS — J44.9 CHRONIC OBSTRUCTIVE PULMONARY DISEASE, UNSPECIFIED COPD TYPE (HCC): ICD-10-CM

## 2021-12-27 DIAGNOSIS — Z00.00 ANNUAL PHYSICAL EXAM: Primary | ICD-10-CM

## 2021-12-27 NOTE — TELEPHONE ENCOUNTER
Rx Refill Note  Requested Prescriptions     Pending Prescriptions Disp Refills   • fluticasone-salmeterol (ADVAIR) 250-50 MCG/DOSE DISKUS 60 each 1     Sig: Inhale 1 puff 2 (Two) Times a Day      Last office visit with prescribing clinician: 01/25/21  Next office visit with prescribing clinician: 01/24/22  Pt would like to get her yearly lab order to complete before her appt Jan 24th.     \  }Aide Ruby LPN  12/27/21, 15:43 EST

## 2022-01-25 ENCOUNTER — OFFICE VISIT (OUTPATIENT)
Dept: FAMILY MEDICINE CLINIC | Age: 57
End: 2022-01-25

## 2022-01-25 ENCOUNTER — LAB (OUTPATIENT)
Dept: LAB | Facility: HOSPITAL | Age: 57
End: 2022-01-25

## 2022-01-25 VITALS
WEIGHT: 135 LBS | SYSTOLIC BLOOD PRESSURE: 121 MMHG | DIASTOLIC BLOOD PRESSURE: 68 MMHG | BODY MASS INDEX: 24.84 KG/M2 | HEART RATE: 69 BPM | TEMPERATURE: 97.9 F | HEIGHT: 62 IN

## 2022-01-25 DIAGNOSIS — Z20.822 CLOSE EXPOSURE TO COVID-19 VIRUS: Primary | ICD-10-CM

## 2022-01-25 DIAGNOSIS — Z00.00 ROUTINE GENERAL MEDICAL EXAMINATION AT A HEALTH CARE FACILITY: ICD-10-CM

## 2022-01-25 DIAGNOSIS — Z00.00 ANNUAL PHYSICAL EXAM: ICD-10-CM

## 2022-01-25 LAB
ALBUMIN SERPL-MCNC: 3.7 G/DL (ref 3.5–5.2)
ALBUMIN/GLOB SERPL: 1 G/DL
ALP SERPL-CCNC: 73 U/L (ref 39–117)
ALT SERPL W P-5'-P-CCNC: 22 U/L (ref 1–33)
ANION GAP SERPL CALCULATED.3IONS-SCNC: 6.7 MMOL/L (ref 5–15)
AST SERPL-CCNC: 23 U/L (ref 1–32)
BASOPHILS # BLD AUTO: 0.07 10*3/MM3 (ref 0–0.2)
BASOPHILS NFR BLD AUTO: 1.3 % (ref 0–1.5)
BILIRUB SERPL-MCNC: 0.4 MG/DL (ref 0–1.2)
BUN SERPL-MCNC: 11 MG/DL (ref 6–20)
BUN/CREAT SERPL: 20.8 (ref 7–25)
CALCIUM SPEC-SCNC: 8.6 MG/DL (ref 8.6–10.5)
CHLORIDE SERPL-SCNC: 106 MMOL/L (ref 98–107)
CHOLEST SERPL-MCNC: 194 MG/DL (ref 0–200)
CO2 SERPL-SCNC: 27.3 MMOL/L (ref 22–29)
CREAT SERPL-MCNC: 0.53 MG/DL (ref 0.57–1)
DEPRECATED RDW RBC AUTO: 34.9 FL (ref 37–54)
EOSINOPHIL # BLD AUTO: 0.53 10*3/MM3 (ref 0–0.4)
EOSINOPHIL NFR BLD AUTO: 10.2 % (ref 0.3–6.2)
ERYTHROCYTE [DISTWIDTH] IN BLOOD BY AUTOMATED COUNT: 16.5 % (ref 12.3–15.4)
GFR SERPL CREATININE-BSD FRML MDRD: 119 ML/MIN/1.73
GFR SERPL CREATININE-BSD FRML MDRD: 145 ML/MIN/1.73
GLOBULIN UR ELPH-MCNC: 3.6 GM/DL
GLUCOSE SERPL-MCNC: 104 MG/DL (ref 65–99)
HCT VFR BLD AUTO: 35.1 % (ref 34–46.6)
HDLC SERPL-MCNC: 71 MG/DL (ref 40–60)
HGB BLD-MCNC: 10.8 G/DL (ref 12–15.9)
IMM GRANULOCYTES # BLD AUTO: 0.01 10*3/MM3 (ref 0–0.05)
IMM GRANULOCYTES NFR BLD AUTO: 0.2 % (ref 0–0.5)
LDLC SERPL CALC-MCNC: 114 MG/DL (ref 0–100)
LDLC/HDLC SERPL: 1.61 {RATIO}
LYMPHOCYTES # BLD AUTO: 1.92 10*3/MM3 (ref 0.7–3.1)
LYMPHOCYTES NFR BLD AUTO: 37 % (ref 19.6–45.3)
MCH RBC QN AUTO: 19.9 PG (ref 26.6–33)
MCHC RBC AUTO-ENTMCNC: 30.8 G/DL (ref 31.5–35.7)
MCV RBC AUTO: 64.5 FL (ref 79–97)
MONOCYTES # BLD AUTO: 0.39 10*3/MM3 (ref 0.1–0.9)
MONOCYTES NFR BLD AUTO: 7.5 % (ref 5–12)
NEUTROPHILS NFR BLD AUTO: 2.27 10*3/MM3 (ref 1.7–7)
NEUTROPHILS NFR BLD AUTO: 43.8 % (ref 42.7–76)
PLATELET # BLD AUTO: 283 10*3/MM3 (ref 140–450)
PMV BLD AUTO: 9.7 FL (ref 6–12)
POTASSIUM SERPL-SCNC: 4.3 MMOL/L (ref 3.5–5.2)
PROT SERPL-MCNC: 7.3 G/DL (ref 6–8.5)
RBC # BLD AUTO: 5.44 10*6/MM3 (ref 3.77–5.28)
SODIUM SERPL-SCNC: 140 MMOL/L (ref 136–145)
TRIGL SERPL-MCNC: 45 MG/DL (ref 0–150)
TSH SERPL DL<=0.05 MIU/L-ACNC: 1.38 UIU/ML (ref 0.27–4.2)
VLDLC SERPL-MCNC: 9 MG/DL (ref 5–40)
WBC NRBC COR # BLD: 5.19 10*3/MM3 (ref 3.4–10.8)

## 2022-01-25 PROCEDURE — 86803 HEPATITIS C AB TEST: CPT

## 2022-01-25 PROCEDURE — 80050 GENERAL HEALTH PANEL: CPT

## 2022-01-25 PROCEDURE — U0004 COV-19 TEST NON-CDC HGH THRU: HCPCS | Performed by: NURSE PRACTITIONER

## 2022-01-25 PROCEDURE — 90471 IMMUNIZATION ADMIN: CPT | Performed by: NURSE PRACTITIONER

## 2022-01-25 PROCEDURE — 99213 OFFICE O/P EST LOW 20 MIN: CPT | Performed by: NURSE PRACTITIONER

## 2022-01-25 PROCEDURE — 99396 PREV VISIT EST AGE 40-64: CPT | Performed by: NURSE PRACTITIONER

## 2022-01-25 PROCEDURE — 90715 TDAP VACCINE 7 YRS/> IM: CPT | Performed by: NURSE PRACTITIONER

## 2022-01-25 PROCEDURE — 36415 COLL VENOUS BLD VENIPUNCTURE: CPT

## 2022-01-25 PROCEDURE — 80061 LIPID PANEL: CPT

## 2022-01-26 LAB
HCV AB SER DONR QL: NORMAL
SARS-COV-2 RNA PNL SPEC NAA+PROBE: NOT DETECTED

## 2022-01-27 ENCOUNTER — TRANSCRIBE ORDERS (OUTPATIENT)
Dept: ADMINISTRATIVE | Facility: HOSPITAL | Age: 57
End: 2022-01-27

## 2022-01-27 DIAGNOSIS — Z12.39 SCREENING BREAST EXAMINATION: Primary | ICD-10-CM

## 2022-01-31 ENCOUNTER — APPOINTMENT (OUTPATIENT)
Dept: MAMMOGRAPHY | Facility: HOSPITAL | Age: 57
End: 2022-01-31

## 2022-02-10 RX ORDER — FERROUS SULFATE 324(65)MG
324 TABLET, DELAYED RELEASE (ENTERIC COATED) ORAL
Qty: 30 TABLET | Refills: 1 | Status: SHIPPED | OUTPATIENT
Start: 2022-02-10

## 2022-02-11 RX ORDER — ALBUTEROL SULFATE 90 UG/1
2 AEROSOL, METERED RESPIRATORY (INHALATION) EVERY 4 HOURS PRN
COMMUNITY
End: 2022-02-11 | Stop reason: SDUPTHER

## 2022-02-11 RX ORDER — ALBUTEROL SULFATE 90 UG/1
2 AEROSOL, METERED RESPIRATORY (INHALATION) EVERY 4 HOURS PRN
Qty: 6.7 G | Refills: 1 | Status: SHIPPED | OUTPATIENT
Start: 2022-02-11 | End: 2022-10-13 | Stop reason: SDUPTHER

## 2022-03-25 ENCOUNTER — TELEPHONE (OUTPATIENT)
Dept: FAMILY MEDICINE CLINIC | Age: 57
End: 2022-03-25

## 2022-03-25 DIAGNOSIS — D64.9 ANEMIA, UNSPECIFIED TYPE: Primary | ICD-10-CM

## 2022-04-04 ENCOUNTER — TELEPHONE (OUTPATIENT)
Dept: FAMILY MEDICINE CLINIC | Age: 57
End: 2022-04-04

## 2022-09-12 ENCOUNTER — TELEPHONE (OUTPATIENT)
Dept: PHARMACY | Facility: HOSPITAL | Age: 57
End: 2022-09-12

## 2022-09-12 ENCOUNTER — LAB (OUTPATIENT)
Dept: LAB | Facility: HOSPITAL | Age: 57
End: 2022-09-12

## 2022-09-12 DIAGNOSIS — Z00.00 ANNUAL PHYSICAL EXAM: ICD-10-CM

## 2022-09-12 DIAGNOSIS — D64.9 ANEMIA, UNSPECIFIED TYPE: ICD-10-CM

## 2022-09-12 LAB
BASOPHILS # BLD AUTO: 0.06 10*3/MM3 (ref 0–0.2)
BASOPHILS NFR BLD AUTO: 0.9 % (ref 0–1.5)
CHOLEST SERPL-MCNC: 226 MG/DL (ref 0–200)
DEPRECATED RDW RBC AUTO: 34.4 FL (ref 37–54)
EOSINOPHIL # BLD AUTO: 0.46 10*3/MM3 (ref 0–0.4)
EOSINOPHIL NFR BLD AUTO: 7.1 % (ref 0.3–6.2)
ERYTHROCYTE [DISTWIDTH] IN BLOOD BY AUTOMATED COUNT: 16 % (ref 12.3–15.4)
HCT VFR BLD AUTO: 36.6 % (ref 34–46.6)
HDLC SERPL-MCNC: 67 MG/DL (ref 40–60)
HGB BLD-MCNC: 11.1 G/DL (ref 12–15.9)
IMM GRANULOCYTES # BLD AUTO: 0.01 10*3/MM3 (ref 0–0.05)
IMM GRANULOCYTES NFR BLD AUTO: 0.2 % (ref 0–0.5)
IRON 24H UR-MRATE: 78 MCG/DL (ref 37–145)
LDLC SERPL CALC-MCNC: 136 MG/DL (ref 0–100)
LDLC/HDLC SERPL: 1.99 {RATIO}
LYMPHOCYTES # BLD AUTO: 2.62 10*3/MM3 (ref 0.7–3.1)
LYMPHOCYTES NFR BLD AUTO: 40.4 % (ref 19.6–45.3)
MCH RBC QN AUTO: 19.4 PG (ref 26.6–33)
MCHC RBC AUTO-ENTMCNC: 30.3 G/DL (ref 31.5–35.7)
MCV RBC AUTO: 63.9 FL (ref 79–97)
MONOCYTES # BLD AUTO: 0.39 10*3/MM3 (ref 0.1–0.9)
MONOCYTES NFR BLD AUTO: 6 % (ref 5–12)
NEUTROPHILS NFR BLD AUTO: 2.94 10*3/MM3 (ref 1.7–7)
NEUTROPHILS NFR BLD AUTO: 45.4 % (ref 42.7–76)
PLATELET # BLD AUTO: 269 10*3/MM3 (ref 140–450)
PMV BLD AUTO: 9.7 FL (ref 6–12)
RBC # BLD AUTO: 5.73 10*6/MM3 (ref 3.77–5.28)
TRIGL SERPL-MCNC: 130 MG/DL (ref 0–150)
TSH SERPL DL<=0.05 MIU/L-ACNC: 0.83 UIU/ML (ref 0.27–4.2)
VLDLC SERPL-MCNC: 23 MG/DL (ref 5–40)
WBC NRBC COR # BLD: 6.48 10*3/MM3 (ref 3.4–10.8)

## 2022-09-12 PROCEDURE — 80061 LIPID PANEL: CPT

## 2022-09-12 PROCEDURE — 84443 ASSAY THYROID STIM HORMONE: CPT

## 2022-09-12 PROCEDURE — 36415 COLL VENOUS BLD VENIPUNCTURE: CPT

## 2022-09-12 PROCEDURE — 83540 ASSAY OF IRON: CPT

## 2022-09-12 PROCEDURE — 85025 COMPLETE CBC W/AUTO DIFF WBC: CPT

## 2022-09-12 NOTE — TELEPHONE ENCOUNTER
Refill request Advair 100/50 -- [facility] will not let me send typical refill request over      Lelo senior

## 2022-09-13 ENCOUNTER — OFFICE VISIT (OUTPATIENT)
Dept: FAMILY MEDICINE CLINIC | Age: 57
End: 2022-09-13

## 2022-09-13 VITALS
OXYGEN SATURATION: 99 % | BODY MASS INDEX: 24.48 KG/M2 | SYSTOLIC BLOOD PRESSURE: 107 MMHG | DIASTOLIC BLOOD PRESSURE: 63 MMHG | WEIGHT: 133 LBS | HEART RATE: 79 BPM | TEMPERATURE: 97.6 F | HEIGHT: 62 IN

## 2022-09-13 DIAGNOSIS — R00.2 PALPITATIONS: ICD-10-CM

## 2022-09-13 DIAGNOSIS — J43.8 OTHER EMPHYSEMA: Primary | ICD-10-CM

## 2022-09-13 PROCEDURE — 99213 OFFICE O/P EST LOW 20 MIN: CPT | Performed by: NURSE PRACTITIONER

## 2022-09-13 RX ORDER — FLUTICASONE PROPIONATE AND SALMETEROL 100; 50 UG/1; UG/1
1 POWDER RESPIRATORY (INHALATION) 2 TIMES DAILY
Qty: 60 EACH | Refills: 5 | Status: SHIPPED | OUTPATIENT
Start: 2022-09-13 | End: 2022-10-13 | Stop reason: SDUPTHER

## 2022-09-13 NOTE — ASSESSMENT & PLAN NOTE
Reviewed labs she recently had collected, TSH normal, she denies excessive caffeine, discussed getting a Holter, will go ahead and have her see cardiology for a consult, if her symptoms persist or change, follow up sooner or go to ER

## 2022-09-13 NOTE — PROGRESS NOTES
Lisa Luna presents to [facility].    Chief Complaint:  Med Refill (Needs advair refilled )         History of Present Illness:  Follow up COPD:  On Adviar  Symptoms: helps with cough and wheezing  Still smokes some       PAST MEDICAL HISTORY changes since :       COPD/smoker       GYNECOLOGICAL HISTORY:        Menopause at age 48.          PREVENTIVE HEALTH MAINTENANCE             COLORECTAL CANCER SCREENING: declines colorectal cancer screening, understands reason for testing     Hepatitis C Medicare Screening: was last done 19; negative     MAMMOGRAM: was last done 20 with normal results     PAP SMEAR: was last done  with normal results         Surgical History:     NONE         Family History:         Father:  at age 76;  lymphoma     Mother:  at age 75     Daughter(s): 2 daughter(s) total;  Type 2 Diabetes         Social History: [facility]     Occupation: a [employer].   TB[employer] works second shift /also works [employer] part time     Marital Status:      Children: 2 children               Review of Systems:  Review of Systems   Constitutional: Negative for fatigue and fever.   Respiratory: Positive for cough (stable). Negative for shortness of breath.    Cardiovascular: Positive for palpitations (for two weeks at night or early am at work while exerting herself, when she rest it goes away ,  does not occur every night/does occ drink monster drink ). Negative for chest pain and leg swelling.          Current Outpatient Medications:   •  albuterol sulfate  (90 Base) MCG/ACT inhaler, Inhale 2 puffs by mouth Every 4 (Four) Hours As Needed for Shortness of Air., Disp: 6.7 g, Rfl: 1  •  ferrous sulfate 324 MG tablet delayed-release, Take 1 tablet by mouth Daily With Breakfast., Disp: 30 tablet, Rfl: 1  •  Fluticasone-Salmeterol (ADVAIR/WIXELA) 100-50 MCG/ACT DISKUS, Inhale 1 puff 2 (Two) Times a Day., Disp: 60 each, Rfl: 5    Vital Signs:  "  Vitals:    09/13/22 0952   BP: 107/63   BP Location: Right arm   Patient Position: Sitting   Pulse: 79   Temp: 97.6 °F (36.4 °C)   TempSrc: Oral   SpO2: 99%   Weight: 60.3 kg (133 lb)   Height: 157.5 cm (62.01\")         Physical Exam:  Physical Exam  Vitals reviewed.   Constitutional:       General: She is not in acute distress.     Appearance: Normal appearance.   Neck:      Vascular: No carotid bruit.   Cardiovascular:      Rate and Rhythm: Normal rate and regular rhythm.      Heart sounds: Normal heart sounds. No murmur heard.  Pulmonary:      Effort: Pulmonary effort is normal. No respiratory distress.      Breath sounds: Normal breath sounds.   Musculoskeletal:      Right lower leg: No edema.      Left lower leg: No edema.   Neurological:      Mental Status: She is alert.   Psychiatric:         Mood and Affect: Mood normal.         Behavior: Behavior normal.         Result Review      The following data was reviewed by: SOHAN Foreman on 09/13/2022:    Results for orders placed or performed in visit on 09/12/22   TSH    Specimen: Blood   Result Value Ref Range    TSH 0.831 0.270 - 4.200 uIU/mL   Lipid Panel    Specimen: Blood   Result Value Ref Range    Total Cholesterol 226 (H) 0 - 200 mg/dL    Triglycerides 130 0 - 150 mg/dL    HDL Cholesterol 67 (H) 40 - 60 mg/dL    LDL Cholesterol  136 (H) 0 - 100 mg/dL    VLDL Cholesterol 23 5 - 40 mg/dL    LDL/HDL Ratio 1.99    Iron level    Specimen: Blood   Result Value Ref Range    Iron 78 37 - 145 mcg/dL   CBC Auto Differential    Specimen: Blood   Result Value Ref Range    WBC 6.48 3.40 - 10.80 10*3/mm3    RBC 5.73 (H) 3.77 - 5.28 10*6/mm3    Hemoglobin 11.1 (L) 12.0 - 15.9 g/dL    Hematocrit 36.6 34.0 - 46.6 %    MCV 63.9 (L) 79.0 - 97.0 fL    MCH 19.4 (L) 26.6 - 33.0 pg    MCHC 30.3 (L) 31.5 - 35.7 g/dL    RDW 16.0 (H) 12.3 - 15.4 %    RDW-SD 34.4 (L) 37.0 - 54.0 fl    MPV 9.7 6.0 - 12.0 fL    Platelets 269 140 - 450 10*3/mm3    Neutrophil % 45.4 42.7 " - 76.0 %    Lymphocyte % 40.4 19.6 - 45.3 %    Monocyte % 6.0 5.0 - 12.0 %    Eosinophil % 7.1 (H) 0.3 - 6.2 %    Basophil % 0.9 0.0 - 1.5 %    Immature Grans % 0.2 0.0 - 0.5 %    Neutrophils, Absolute 2.94 1.70 - 7.00 10*3/mm3    Lymphocytes, Absolute 2.62 0.70 - 3.10 10*3/mm3    Monocytes, Absolute 0.39 0.10 - 0.90 10*3/mm3    Eosinophils, Absolute 0.46 (H) 0.00 - 0.40 10*3/mm3    Basophils, Absolute 0.06 0.00 - 0.20 10*3/mm3    Immature Grans, Absolute 0.01 0.00 - 0.05 10*3/mm3               Assessment and Plan:          Diagnoses and all orders for this visit:    1. Other emphysema (HCC) (Primary)  Assessment & Plan:  Refilled her adviar, she has the albuterol rescue inhaler, advised her to quit smoking       Orders:  -     Fluticasone-Salmeterol (ADVAIR/WIXELA) 100-50 MCG/ACT DISKUS; Inhale 1 puff 2 (Two) Times a Day.  Dispense: 60 each; Refill: 5    2. Palpitations  Assessment & Plan:  Reviewed labs she recently had collected, TSH normal, she denies excessive caffeine, discussed getting a Holter, will go ahead and have her see cardiology for a consult, if her symptoms persist or change, follow up sooner or go to ER     Orders:  -     Ambulatory Referral to Cardiology        Follow Up   Return if symptoms worsen or fail to improve.  Patient was given instructions and counseling regarding her condition or for health maintenance advice. Please see specific information pulled into the AVS if appropriate.

## 2022-09-22 ENCOUNTER — OFFICE VISIT (OUTPATIENT)
Dept: FAMILY MEDICINE CLINIC | Age: 57
End: 2022-09-22

## 2022-09-22 VITALS
HEIGHT: 62 IN | HEART RATE: 74 BPM | SYSTOLIC BLOOD PRESSURE: 130 MMHG | RESPIRATION RATE: 18 BRPM | WEIGHT: 136.6 LBS | OXYGEN SATURATION: 95 % | TEMPERATURE: 99.4 F | DIASTOLIC BLOOD PRESSURE: 72 MMHG | BODY MASS INDEX: 25.14 KG/M2

## 2022-09-22 DIAGNOSIS — U07.1 COVID-19: Primary | ICD-10-CM

## 2022-09-22 DIAGNOSIS — J43.8 OTHER EMPHYSEMA: ICD-10-CM

## 2022-09-22 PROCEDURE — 99213 OFFICE O/P EST LOW 20 MIN: CPT | Performed by: NURSE PRACTITIONER

## 2022-09-22 NOTE — ASSESSMENT & PLAN NOTE
Rest, increase fluids, follow up if symptoms progress or change   covid quarnatine discussed, (handouts given) symptomatic treatment, due to high risk also will treat with paxlovid, discussed risk vs benefit, also to try mucinex / mucinex dm

## 2022-09-22 NOTE — PROGRESS NOTES
Lisa Luna presents to [facility].    Chief Complaint:  Covid 19 (Tested positive for Covid 19 on 22 through a home test. Needs a work note for isolation. /Cough, body aches, chills, congestion, nausea, and fatigue/Denies fever, vomiting, diarrhea, sore throat, headache, and shortness of breath/Symptoms started 22)         History of Present Illness:  URI  When did symptoms started 2 days ago  Any exposures: covid +  Symptoms: cough, body aches, chills, congestion,nausea, fatigue   Treatment tried: nothing  History of COPD  Tested positive yesterday for COVID     PAST MEDICAL HISTORY changes since 2022:        COPD/smoker        GYNECOLOGICAL HISTORY:        Menopause at age 48.          PREVENTIVE HEALTH MAINTENANCE             COLORECTAL CANCER SCREENING: declines colorectal cancer screening, understands reason for testing     Hepatitis C Medicare Screening: was last done 19; negative     MAMMOGRAM: was last done 20 with normal results     PAP SMEAR: was last done  with normal results         Surgical History:     NONE         Family History:          Father:  at age 76;  lymphoma     Mother:  at age 75     Daughter(s): 2 daughter(s) total;  Type 2 Diabetes         Social History: [employer]     Occupation: a [employer].   [employer] works second shift /also works [employer] part time     Marital Status:      Children: 2 children               Review of Systems:  Review of Systems   Constitutional: Negative for fever.   HENT: Negative for sore throat.    Respiratory: Negative for shortness of breath.    Cardiovascular: Negative for chest pain.          Current Outpatient Medications:   •  albuterol sulfate  (90 Base) MCG/ACT inhaler, Inhale 2 puffs by mouth Every 4 (Four) Hours As Needed for Shortness of Air., Disp: 6.7 g, Rfl: 1  •  ferrous sulfate 324 MG tablet delayed-release, Take 1 tablet by mouth Daily With Breakfast.,  "Disp: 30 tablet, Rfl: 1  •  Fluticasone-Salmeterol (ADVAIR/WIXELA) 100-50 MCG/ACT DISKUS, Inhale 1 puff 2 (Two) Times a Day., Disp: 60 each, Rfl: 5  •  Nirmatrelvir&Ritonavir 300/100 (PAXLOVID) 20 x 150 MG & 10 x 100MG tablet therapy pack tablet, Take 3 tablets by mouth 2 (Two) Times a Day for 5 days., Disp: 30 tablet, Rfl: 0    Vital Signs:   Vitals:    09/22/22 1017   BP: 130/72   BP Location: Left arm   Patient Position: Sitting   Cuff Size: Adult   Pulse: 74   Resp: 18   Temp: 99.4 °F (37.4 °C)   TempSrc: Oral   SpO2: 95%  Comment: Room air   Weight: 62 kg (136 lb 9.6 oz)   Height: 157.5 cm (62.01\")   PainSc:   2   PainLoc: Generalized         Physical Exam:  Physical Exam  Constitutional:       General: She is not in acute distress.     Appearance: Normal appearance.   HENT:      Right Ear: Tympanic membrane, ear canal and external ear normal.      Left Ear: Tympanic membrane, ear canal and external ear normal.      Nose: Nose normal.      Mouth/Throat:      Pharynx: Oropharynx is clear. No posterior oropharyngeal erythema.   Cardiovascular:      Rate and Rhythm: Normal rate and regular rhythm.      Heart sounds: No murmur heard.  Pulmonary:      Effort: Pulmonary effort is normal.      Breath sounds: Normal breath sounds.   Lymphadenopathy:      Cervical: No cervical adenopathy.   Neurological:      Mental Status: She is alert.   Psychiatric:         Mood and Affect: Mood normal.         Behavior: Behavior normal.         Result Review      The following data was reviewed by: SOHAN Foreman on 09/22/2022:    Results for orders placed or performed in visit on 09/12/22   TSH    Specimen: Blood   Result Value Ref Range    TSH 0.831 0.270 - 4.200 uIU/mL   Lipid Panel    Specimen: Blood   Result Value Ref Range    Total Cholesterol 226 (H) 0 - 200 mg/dL    Triglycerides 130 0 - 150 mg/dL    HDL Cholesterol 67 (H) 40 - 60 mg/dL    LDL Cholesterol  136 (H) 0 - 100 mg/dL    VLDL Cholesterol 23 5 - 40 mg/dL "    LDL/HDL Ratio 1.99    Iron level    Specimen: Blood   Result Value Ref Range    Iron 78 37 - 145 mcg/dL   CBC Auto Differential    Specimen: Blood   Result Value Ref Range    WBC 6.48 3.40 - 10.80 10*3/mm3    RBC 5.73 (H) 3.77 - 5.28 10*6/mm3    Hemoglobin 11.1 (L) 12.0 - 15.9 g/dL    Hematocrit 36.6 34.0 - 46.6 %    MCV 63.9 (L) 79.0 - 97.0 fL    MCH 19.4 (L) 26.6 - 33.0 pg    MCHC 30.3 (L) 31.5 - 35.7 g/dL    RDW 16.0 (H) 12.3 - 15.4 %    RDW-SD 34.4 (L) 37.0 - 54.0 fl    MPV 9.7 6.0 - 12.0 fL    Platelets 269 140 - 450 10*3/mm3    Neutrophil % 45.4 42.7 - 76.0 %    Lymphocyte % 40.4 19.6 - 45.3 %    Monocyte % 6.0 5.0 - 12.0 %    Eosinophil % 7.1 (H) 0.3 - 6.2 %    Basophil % 0.9 0.0 - 1.5 %    Immature Grans % 0.2 0.0 - 0.5 %    Neutrophils, Absolute 2.94 1.70 - 7.00 10*3/mm3    Lymphocytes, Absolute 2.62 0.70 - 3.10 10*3/mm3    Monocytes, Absolute 0.39 0.10 - 0.90 10*3/mm3    Eosinophils, Absolute 0.46 (H) 0.00 - 0.40 10*3/mm3    Basophils, Absolute 0.06 0.00 - 0.20 10*3/mm3    Immature Grans, Absolute 0.01 0.00 - 0.05 10*3/mm3               Assessment and Plan:          Diagnoses and all orders for this visit:    1. COVID-19 (Primary)  Assessment & Plan:  Rest, increase fluids, follow up if symptoms progress or change   covid belen discussed, (handouts given) symptomatic treatment, due to high risk also will treat with paxlovid, discussed risk vs benefit, also to try mucinex / mucinex dm     Orders:  -     Nirmatrelvir&Ritonavir 300/100 (PAXLOVID) 20 x 150 MG & 10 x 100MG tablet therapy pack tablet; Take 3 tablets by mouth 2 (Two) Times a Day for 5 days.  Dispense: 30 tablet; Refill: 0    2. Other emphysema (HCC)  Assessment & Plan:  Can continue advair and use albuterol if needed, if symptoms worsen seek additional medical care      Orders:  -     Nirmatrelvir&Ritonavir 300/100 (PAXLOVID) 20 x 150 MG & 10 x 100MG tablet therapy pack tablet; Take 3 tablets by mouth 2 (Two) Times a Day for 5 days.   Dispense: 30 tablet; Refill: 0        Follow Up   Return if symptoms worsen or fail to improve.  Patient was given instructions and counseling regarding her condition or for health maintenance advice. Please see specific information pulled into the AVS if appropriate.

## 2022-09-22 NOTE — ASSESSMENT & PLAN NOTE
Can continue advair and use albuterol if needed, if symptoms worsen seek additional medical care

## 2022-09-30 ENCOUNTER — OFFICE VISIT (OUTPATIENT)
Dept: FAMILY MEDICINE CLINIC | Age: 57
End: 2022-09-30

## 2022-09-30 VITALS
TEMPERATURE: 98.9 F | HEART RATE: 74 BPM | OXYGEN SATURATION: 96 % | SYSTOLIC BLOOD PRESSURE: 117 MMHG | HEIGHT: 62 IN | WEIGHT: 133.4 LBS | BODY MASS INDEX: 24.55 KG/M2 | DIASTOLIC BLOOD PRESSURE: 79 MMHG

## 2022-09-30 DIAGNOSIS — N39.0 ACUTE UTI: Primary | ICD-10-CM

## 2022-09-30 DIAGNOSIS — R30.9 PAINFUL URINATION: ICD-10-CM

## 2022-09-30 LAB
BILIRUB BLD-MCNC: NEGATIVE MG/DL
CLARITY, POC: CLEAR
COLOR UR: YELLOW
EXPIRATION DATE: ABNORMAL
GLUCOSE UR STRIP-MCNC: NEGATIVE MG/DL
KETONES UR QL: NEGATIVE
LEUKOCYTE EST, POC: ABNORMAL
Lab: ABNORMAL
NITRITE UR-MCNC: NEGATIVE MG/ML
PH UR: 6 [PH] (ref 5–8)
PROT UR STRIP-MCNC: ABNORMAL MG/DL
RBC # UR STRIP: ABNORMAL /UL
SP GR UR: 1.01 (ref 1–1.03)
UROBILINOGEN UR QL: ABNORMAL

## 2022-09-30 PROCEDURE — 87077 CULTURE AEROBIC IDENTIFY: CPT | Performed by: PHYSICIAN ASSISTANT

## 2022-09-30 PROCEDURE — 87086 URINE CULTURE/COLONY COUNT: CPT | Performed by: PHYSICIAN ASSISTANT

## 2022-09-30 PROCEDURE — 99213 OFFICE O/P EST LOW 20 MIN: CPT | Performed by: PHYSICIAN ASSISTANT

## 2022-09-30 PROCEDURE — 87186 SC STD MICRODIL/AGAR DIL: CPT | Performed by: PHYSICIAN ASSISTANT

## 2022-09-30 PROCEDURE — 81003 URINALYSIS AUTO W/O SCOPE: CPT | Performed by: PHYSICIAN ASSISTANT

## 2022-09-30 RX ORDER — NITROFURANTOIN 25; 75 MG/1; MG/1
100 CAPSULE ORAL 2 TIMES DAILY
Qty: 10 CAPSULE | Refills: 0 | Status: SHIPPED | OUTPATIENT
Start: 2022-09-30 | End: 2022-10-05

## 2022-09-30 NOTE — PROGRESS NOTES
"Subjective     CHIEF COMPLAINT    Chief Complaint   Patient presents with   • Urinary Tract Infection     Sx are burning and frequency. Ongoing for a 2-3 days.            History of Present Illness  This is a 57-year-old female presenting to clinic complaining of dysuria and urinary frequency for the last 2 days.  She denies any fevers, chills, nausea or vomiting and is otherwise feeling well.  She does report recently having COVID but states those symptoms have improved significantly.        Review of Systems   Constitutional: Negative for chills and fever.   Gastrointestinal: Negative for abdominal pain, nausea and vomiting.   Genitourinary: Positive for dysuria and frequency. Negative for flank pain, hematuria, urgency, vaginal bleeding and vaginal discharge.   Musculoskeletal: Negative for back pain.            History reviewed. No pertinent past medical history.         History reviewed. No pertinent surgical history.         History reviewed. No pertinent family history.         Social History     Socioeconomic History   • Marital status:    Tobacco Use   • Smoking status: Current Every Day Smoker     Packs/day: 0.50     Types: Cigarettes   • Smokeless tobacco: Never Used            No Known Allergies         Current Outpatient Medications on File Prior to Visit   Medication Sig Dispense Refill   • albuterol sulfate  (90 Base) MCG/ACT inhaler Inhale 2 puffs by mouth Every 4 (Four) Hours As Needed for Shortness of Air. 6.7 g 1   • ferrous sulfate 324 MG tablet delayed-release Take 1 tablet by mouth Daily With Breakfast. 30 tablet 1   • Fluticasone-Salmeterol (ADVAIR/WIXELA) 100-50 MCG/ACT DISKUS Inhale 1 puff 2 (Two) Times a Day. 60 each 5     No current facility-administered medications on file prior to visit.            /79 (BP Location: Left arm, Patient Position: Sitting, Cuff Size: Adult)   Pulse 74   Temp 98.9 °F (37.2 °C) (Oral)   Ht 157.5 cm (62.01\")   Wt 60.5 kg (133 lb 6.4 oz)  "  SpO2 96% Comment: room air  BMI 24.39 kg/m²          Objective     Physical Exam  Vitals and nursing note reviewed.   Constitutional:       Appearance: Normal appearance.   Cardiovascular:      Rate and Rhythm: Normal rate and regular rhythm.      Heart sounds: Normal heart sounds.   Pulmonary:      Effort: Pulmonary effort is normal.      Breath sounds: Normal breath sounds.   Abdominal:      General: There is no distension.      Palpations: Abdomen is soft.      Tenderness: There is no abdominal tenderness. There is no right CVA tenderness, left CVA tenderness or guarding.   Skin:     General: Skin is warm and dry.      Findings: No rash.   Neurological:      Mental Status: She is alert and oriented to person, place, and time.   Psychiatric:         Mood and Affect: Mood normal.         Behavior: Behavior normal.              Procedures                    Lab Results (last 24 hours)     Procedure Component Value Units Date/Time    POCT urinalysis dipstick, automated [[id number]]  (Abnormal) Collected: 09/30/22 1140    Specimen: Urine Updated: 09/30/22 1142     Color Yellow     Clarity, UA Clear     Specific Gravity  1.015     pH, Urine 6.0     Leukocytes Moderate (2+)     Nitrite, UA Negative     Protein, POC Trace mg/dL      Glucose, UA Negative mg/dL      Ketones, UA Negative     Urobilinogen, UA 0.2 E.U./dL     Bilirubin Negative     Blood, UA Small     Lot Number [id number]     Expiration Date 8/1/23    Urine Culture - Urine, Urine, Clean Catch [[id number]] Collected: 09/30/22 1144    Specimen: Urine, Clean Catch Updated: 09/30/22 1146                No Radiology Exams Resulted Within Past 24 Hours                    Diagnoses and all orders for this visit:    1. Acute UTI (Primary)  -     Urine Culture - Urine, Urine, Clean Catch; Future  -     Urine Culture - Urine, Urine, Clean Catch  -     nitrofurantoin, macrocrystal-monohydrate, (Macrobid) 100 MG capsule; Take 1 capsule by mouth 2 (Two) Times a Day for 5  days.  Dispense: 10 capsule; Refill: 0    2. Painful urination  -     POCT urinalysis dipstick, automated             Additional Instructions for the Follow-ups that You Need to Schedule     Urine Culture - Urine, Urine, Clean Catch    Sep 30, 2022 (Approximate)                      FOR FULL DISCHARGE INSTRUCTIONS/COMMENTS/HANDOUTS please see the AVS

## 2022-10-02 LAB — BACTERIA SPEC AEROBE CULT: ABNORMAL

## 2022-10-13 DIAGNOSIS — J43.8 OTHER EMPHYSEMA: ICD-10-CM

## 2022-10-13 RX ORDER — FLUTICASONE PROPIONATE AND SALMETEROL 100; 50 UG/1; UG/1
1 POWDER RESPIRATORY (INHALATION) 2 TIMES DAILY
Qty: 60 EACH | Refills: 5 | Status: SHIPPED | OUTPATIENT
Start: 2022-10-13 | End: 2023-01-30 | Stop reason: SDUPTHER

## 2022-10-13 RX ORDER — ALBUTEROL SULFATE 90 UG/1
2 AEROSOL, METERED RESPIRATORY (INHALATION) EVERY 4 HOURS PRN
Qty: 6.7 G | Refills: 1 | Status: SHIPPED | OUTPATIENT
Start: 2022-10-13 | End: 2022-12-28 | Stop reason: SDUPTHER

## 2022-10-13 NOTE — TELEPHONE ENCOUNTER
Rx Refill Note  Requested Prescriptions     Pending Prescriptions Disp Refills   • albuterol sulfate  (90 Base) MCG/ACT inhaler 6.7 g 1     Sig: Inhale 2 puffs by mouth Every 4 (Four) Hours As Needed for Shortness of Air.      Last office visit with prescribing clinician: 9/22/2022      Next office visit with prescribing clinician: Visit date not found     Nora Jones LPN  10/13/22, 15:27 EDT       LF BY CHEO 2/11/22, PLEASE ADV.

## 2022-10-13 NOTE — TELEPHONE ENCOUNTER
First check on her, was in here last month for Covid, treated with paxlovid, but then can send in her albut inhaler

## 2022-12-28 RX ORDER — ALBUTEROL SULFATE 90 UG/1
2 AEROSOL, METERED RESPIRATORY (INHALATION) EVERY 4 HOURS PRN
Qty: 6.7 G | Refills: 1 | Status: SHIPPED | OUTPATIENT
Start: 2022-12-28 | End: 2023-01-25 | Stop reason: SDUPTHER

## 2023-01-17 ENCOUNTER — TELEPHONE (OUTPATIENT)
Dept: FAMILY MEDICINE CLINIC | Age: 58
End: 2023-01-17
Payer: COMMERCIAL

## 2023-01-17 NOTE — TELEPHONE ENCOUNTER
Offered a 9:30 am appt. Pt said she would keep the 1:00 appt and come back another day fasting to do lab work.

## 2023-01-17 NOTE — TELEPHONE ENCOUNTER
Caller: Lisa Luna    Relationship: Self    Best call back number: [phone]    What is the best time to reach you: ANY    Who are you requesting to speak with (clinical staff, provider,  specific staff member): CLINICAL    What was the call regarding: PATIENT CALLED AND ASKED IF SHE NEEDS TO FAST FOR HER UPCOMING PHYSICAL? PATIENT ALSO ASKED IF SHE CAN BE SCHEDULED AT 11AM ON 1.30.23 BECAUSE SHE HAS TO WORK, PATIENT IS CURRENTLY SCHEDULED AT 1PM ON THE 30TH?    Do you require a callback: YES

## 2023-01-25 ENCOUNTER — OFFICE VISIT (OUTPATIENT)
Dept: FAMILY MEDICINE CLINIC | Age: 58
End: 2023-01-25
Payer: COMMERCIAL

## 2023-01-25 VITALS
HEIGHT: 62 IN | OXYGEN SATURATION: 95 % | DIASTOLIC BLOOD PRESSURE: 77 MMHG | BODY MASS INDEX: 24.69 KG/M2 | HEART RATE: 79 BPM | WEIGHT: 134.2 LBS | SYSTOLIC BLOOD PRESSURE: 121 MMHG | TEMPERATURE: 98.2 F

## 2023-01-25 DIAGNOSIS — T78.40XA ALLERGY, INITIAL ENCOUNTER: Primary | ICD-10-CM

## 2023-01-25 DIAGNOSIS — R05.1 ACUTE COUGH: ICD-10-CM

## 2023-01-25 PROCEDURE — 99213 OFFICE O/P EST LOW 20 MIN: CPT | Performed by: NURSE PRACTITIONER

## 2023-01-25 PROCEDURE — 87428 SARSCOV & INF VIR A&B AG IA: CPT | Performed by: NURSE PRACTITIONER

## 2023-01-25 RX ORDER — ALBUTEROL SULFATE 90 UG/1
2 AEROSOL, METERED RESPIRATORY (INHALATION) EVERY 4 HOURS PRN
Qty: 6.7 G | Refills: 1 | Status: SHIPPED | OUTPATIENT
Start: 2023-01-25 | End: 2023-01-30 | Stop reason: SDUPTHER

## 2023-01-25 NOTE — LETTER
January 25, 2023     Patient: Lisa Luna   YOB: 1965   Date of Visit: 1/25/2023       To Whom It May Concern:    It is my medical opinion that Lisa Luna may return to work in two days.         Sincerely,        SOHAN Person    CC: No Recipients

## 2023-01-25 NOTE — PROGRESS NOTES
Chief Complaint  Lisa Luna presents to [facility] for Fever (Ongoing since last night. ), Cough, Chills, and Generalized Body Aches    Subjective          History of Present Illness  Here with 2 day hx of body aches, chills, + dry cough, watery eyes, some nausea, runny nose. Taking decongestant, Vit C and ES Tylenol.     Review of Systems   Constitutional: Positive for chills and fever. Negative for fatigue, unexpected weight gain and unexpected weight loss.   HENT: Negative.    Eyes: Negative.    Respiratory: Positive for cough. Negative for shortness of breath and wheezing.    Cardiovascular: Negative for chest pain, palpitations and leg swelling.   Gastrointestinal: Negative for abdominal pain.   Endocrine: Negative.    Genitourinary: Negative for breast lump, breast pain, dysuria, frequency and urgency.   Musculoskeletal: Positive for arthralgias. Negative for gait problem.   Skin: Negative.    Neurological: Negative for dizziness, tremors, seizures, weakness and memory problem.   Psychiatric/Behavioral: Negative for behavioral problems and suicidal ideas.         No Known Allergies   History reviewed. No pertinent past medical history.  Current Outpatient Medications   Medication Sig Dispense Refill   • albuterol sulfate  (90 Base) MCG/ACT inhaler Inhale 2 puffs by mouth Every 4 (Four) Hours As Needed for Shortness of Air. 6.7 g 1   • ferrous sulfate 324 MG tablet delayed-release Take 1 tablet by mouth Daily With Breakfast. 30 tablet 1   • Fluticasone-Salmeterol (ADVAIR/WIXELA) 100-50 MCG/ACT DISKUS Inhale 1 puff 2 (Two) Times a Day. 60 each 5     No current facility-administered medications for this visit.     History reviewed. No pertinent surgical history.   Social History     Tobacco Use   • Smoking status: Every Day     Packs/day: 0.50     Years: 40.00     Pack years: 20.00     Types: Cigarettes   • Smokeless tobacco: Never     History reviewed. No pertinent  "family history.  Health Maintenance Due   Topic Date Due   • COLORECTAL CANCER SCREENING  Never done   • LUNG CANCER SCREENING  Never done   • PAP SMEAR  07/01/2021   • COVID-19 Vaccine (5 - Booster for Moderna series) 03/10/2022   • MAMMOGRAM  03/11/2022   • ANNUAL PHYSICAL  01/25/2023      Immunization History   Administered Date(s) Administered   • COVID-19 (MODERNA) 1st, 2nd, 3rd Dose Only 01/15/2021, 03/23/2021, 04/24/2021, 01/13/2022   • Flu Vaccine Quad PF 6-35MO 01/15/2022   • FluLaval/Fluzone >6mos 10/28/2022   • Tdap 01/25/2022        Objective     Vitals:    01/25/23 1317   BP: 121/77   BP Location: Right arm   Patient Position: Sitting   Cuff Size: Adult   Pulse: 79   Temp: 98.2 °F (36.8 °C)   TempSrc: Oral   SpO2: 95%   Weight: 60.9 kg (134 lb 3.2 oz)   Height: 157.5 cm (62.01\")     Body mass index is 24.54 kg/m².     Physical Exam  Vitals and nursing note reviewed.   Constitutional:       Appearance: Normal appearance.   HENT:      Right Ear: Tympanic membrane normal.      Left Ear: Tympanic membrane normal.      Nose: Nose normal.   Neck:      Vascular: No carotid bruit.   Cardiovascular:      Rate and Rhythm: Normal rate and regular rhythm.      Heart sounds: Normal heart sounds.   Pulmonary:      Effort: Pulmonary effort is normal.      Breath sounds: Normal breath sounds.   Musculoskeletal:         General: Normal range of motion.   Lymphadenopathy:      Cervical: No cervical adenopathy.   Skin:     General: Skin is warm and dry.   Neurological:      General: No focal deficit present.      Mental Status: She is alert.   Psychiatric:         Mood and Affect: Mood normal.         Behavior: Behavior normal.           Result Review :    Office Visit on 01/25/2023   Component Date Value Ref Range Status   • SARS Antigen 01/25/2023 Not Detected  Not Detected, Presumptive Negative Final   • Influenza A Antigen JOSE D 01/25/2023 Not Detected  Not Detected Final   • Influenza B Antigen JOSE D 01/25/2023 Not " Detected  Not Detected Final   • Internal Control 01/25/2023 Passed  Passed Final   • Lot Number 01/25/2023 [id number]   Final   • Expiration Date 01/25/2023 11-   Final   Office Visit on 09/30/2022   Component Date Value Ref Range Status   • Color 09/30/2022 Yellow  Yellow, Straw, Dark Yellow, Jocelynn Final   • Clarity, UA 09/30/2022 Clear  Clear Final   • Specific Gravity  09/30/2022 1.015  1.005 - 1.030 Final   • pH, Urine 09/30/2022 6.0  5.0 - 8.0 Final   • Leukocytes 09/30/2022 Moderate (2+) (A)  Negative Final   • Nitrite, UA 09/30/2022 Negative  Negative Final   • Protein, POC 09/30/2022 Trace (A)  Negative mg/dL Final   • Glucose, UA 09/30/2022 Negative  Negative mg/dL Final   • Ketones, UA 09/30/2022 Negative  Negative Final   • Urobilinogen, UA 09/30/2022 0.2 E.U./dL  Normal, 0.2 E.U./dL Final   • Bilirubin 09/30/2022 Negative  Negative Final   • Blood, UA 09/30/2022 Small (A)  Negative Final   • Lot Number 09/30/2022 [id number]   Final   • Expiration Date 09/30/2022 8/1/23   Final   • Urine Culture 09/30/2022 >100,000 CFU/mL Escherichia coli (A)   Final   Lab on 09/12/2022   Component Date Value Ref Range Status   • TSH 09/12/2022 0.831  0.270 - 4.200 uIU/mL Final   • Total Cholesterol 09/12/2022 226 (H)  0 - 200 mg/dL Final   • Triglycerides 09/12/2022 130  0 - 150 mg/dL Final   • HDL Cholesterol 09/12/2022 67 (H)  40 - 60 mg/dL Final   • LDL Cholesterol  09/12/2022 136 (H)  0 - 100 mg/dL Final   • VLDL Cholesterol 09/12/2022 23  5 - 40 mg/dL Final   • LDL/HDL Ratio 09/12/2022 1.99   Final   • Iron 09/12/2022 78  37 - 145 mcg/dL Final   • WBC 09/12/2022 6.48  3.40 - 10.80 10*3/mm3 Final   • RBC 09/12/2022 5.73 (H)  3.77 - 5.28 10*6/mm3 Final   • Hemoglobin 09/12/2022 11.1 (L)  12.0 - 15.9 g/dL Final   • Hematocrit 09/12/2022 36.6  34.0 - 46.6 % Final   • MCV 09/12/2022 63.9 (L)  79.0 - 97.0 fL Final   • MCH 09/12/2022 19.4 (L)  26.6 - 33.0 pg Final   • MCHC 09/12/2022 30.3 (L)  31.5 - 35.7 g/dL Final   •  RDW 09/12/2022 16.0 (H)  12.3 - 15.4 % Final   • RDW-SD 09/12/2022 34.4 (L)  37.0 - 54.0 fl Final   • MPV 09/12/2022 9.7  6.0 - 12.0 fL Final   • Platelets 09/12/2022 269  140 - 450 10*3/mm3 Final   • Neutrophil % 09/12/2022 45.4  42.7 - 76.0 % Final   • Lymphocyte % 09/12/2022 40.4  19.6 - 45.3 % Final   • Monocyte % 09/12/2022 6.0  5.0 - 12.0 % Final   • Eosinophil % 09/12/2022 7.1 (H)  0.3 - 6.2 % Final   • Basophil % 09/12/2022 0.9  0.0 - 1.5 % Final   • Immature Grans % 09/12/2022 0.2  0.0 - 0.5 % Final   • Neutrophils, Absolute 09/12/2022 2.94  1.70 - 7.00 10*3/mm3 Final   • Lymphocytes, Absolute 09/12/2022 2.62  0.70 - 3.10 10*3/mm3 Final   • Monocytes, Absolute 09/12/2022 0.39  0.10 - 0.90 10*3/mm3 Final   • Eosinophils, Absolute 09/12/2022 0.46 (H)  0.00 - 0.40 10*3/mm3 Final   • Basophils, Absolute 09/12/2022 0.06  0.00 - 0.20 10*3/mm3 Final   • Immature Grans, Absolute 09/12/2022 0.01  0.00 - 0.05 10*3/mm3 Final                        Assessment and Plan      Diagnoses and all orders for this visit:    1. Allergy, initial encounter (Primary)  Comments:  recommend otc zyrtec for symptoms   Orders:  -     albuterol sulfate  (90 Base) MCG/ACT inhaler; Inhale 2 puffs by mouth Every 4 (Four) Hours As Needed for Shortness of Air.  Dispense: 6.7 g; Refill: 1    2. Acute cough  -     POCT SARS-CoV-2 Antigen JOSE D + Flu            Follow Up     Return if symptoms worsen or fail to improve.

## 2023-01-30 ENCOUNTER — OFFICE VISIT (OUTPATIENT)
Dept: FAMILY MEDICINE CLINIC | Age: 58
End: 2023-01-30
Payer: COMMERCIAL

## 2023-01-30 VITALS
RESPIRATION RATE: 17 BRPM | WEIGHT: 135.6 LBS | SYSTOLIC BLOOD PRESSURE: 126 MMHG | BODY MASS INDEX: 24.95 KG/M2 | HEIGHT: 62 IN | HEART RATE: 79 BPM | DIASTOLIC BLOOD PRESSURE: 85 MMHG

## 2023-01-30 DIAGNOSIS — Z00.00 ROUTINE GENERAL MEDICAL EXAMINATION AT A HEALTH CARE FACILITY: Primary | ICD-10-CM

## 2023-01-30 DIAGNOSIS — J43.8 OTHER EMPHYSEMA: ICD-10-CM

## 2023-01-30 DIAGNOSIS — Z12.31 ENCOUNTER FOR SCREENING MAMMOGRAM FOR MALIGNANT NEOPLASM OF BREAST: ICD-10-CM

## 2023-01-30 PROBLEM — U07.1 COVID-19: Status: RESOLVED | Noted: 2022-09-22 | Resolved: 2023-01-30

## 2023-01-30 PROCEDURE — 99396 PREV VISIT EST AGE 40-64: CPT | Performed by: NURSE PRACTITIONER

## 2023-01-30 RX ORDER — FLUTICASONE PROPIONATE AND SALMETEROL 100; 50 UG/1; UG/1
1 POWDER RESPIRATORY (INHALATION) 2 TIMES DAILY
Qty: 60 EACH | Refills: 5 | Status: SHIPPED | OUTPATIENT
Start: 2023-01-30

## 2023-01-30 RX ORDER — ALBUTEROL SULFATE 90 UG/1
2 AEROSOL, METERED RESPIRATORY (INHALATION) EVERY 4 HOURS PRN
Qty: 6.7 G | Refills: 1 | Status: SHIPPED | OUTPATIENT
Start: 2023-01-30

## 2023-01-30 NOTE — ASSESSMENT & PLAN NOTE
Follow a healthy diet and get regular exercise.  Always wear seat belts.    Do monthly BSE, advised colon screening declined, reviewed EMD chart, return fasting for labs, set up mammogram, wants to wait until March, she is going to the [facility] next month

## 2023-01-30 NOTE — PROGRESS NOTES
Lisa Luna presents to [facility].    Chief Complaint:  Annual Exam         History of Present Illness:  General Health Questions  Regular exercise as least 3 days a week: yes   Follows a healthy diet: no   Wears seat belt always: yes   Drinks Alcohol: rarely   Uses Recreational drugs*: none   Uses tobacco products: 3-4 cig per day   UTD on Tetanus vaccine:   Does BSE: yes   Last mammogram: 3-2020  Last pap : A benjie did and was negative     Last colon screen: declines       PAST MEDICAL HISTORY changes since :      covid +     COPD/smoker        GYNECOLOGICAL HISTORY:        Menopause at age 48.          PREVENTIVE HEALTH MAINTENANCE             COLORECTAL CANCER SCREENING: declines colorectal cancer screening, understands reason for testing     Hepatitis C Medicare Screening: was last done 19; negative     MAMMOGRAM: was last done 20 with normal results     PAP SMEAR: was last done  with normal results         Surgical History:     NONE         Family History:          Father:  at age 76;  lymphoma     Mother:  at age 75     Daughter(s): 2 daughter(s) total;  Type 2 Diabetes         Social History: [facility]     Occupation: a [employer].   TBKY works second shift /also works [employer] part time     Marital Status:      Children: 2 children           Review of Systems:  Review of Systems   Constitutional: Negative for fatigue and fever.   HENT: Negative for ear pain and sore throat.    Eyes: Negative for blurred vision.   Respiratory: Positive for cough (improved, on zyrtec now ). Negative for shortness of breath.         Has COPD and needs to have refills on her rescue inhaler and adviar    Cardiovascular: Negative for chest pain, palpitations and leg swelling.   Gastrointestinal: Negative for abdominal pain, blood in stool, constipation, diarrhea, nausea and vomiting.   Musculoskeletal: Negative for arthralgias and myalgias.  "  Skin: Negative for rash.   Neurological: Negative for dizziness, weakness and headache.   Psychiatric/Behavioral: Negative for sleep disturbance and depressed mood.          Current Outpatient Medications:   •  albuterol sulfate  (90 Base) MCG/ACT inhaler, Inhale 2 puffs by mouth Every 4 (Four) Hours As Needed for Shortness of Air., Disp: 6.7 g, Rfl: 1  •  ferrous sulfate 324 MG tablet delayed-release, Take 1 tablet by mouth Daily With Breakfast., Disp: 30 tablet, Rfl: 1  •  Fluticasone-Salmeterol (ADVAIR/WIXELA) 100-50 MCG/ACT DISKUS, Inhale 1 puff 2 (Two) Times a Day., Disp: 60 each, Rfl: 5    Vital Signs:   Vitals:    01/30/23 1252   BP: 126/85   BP Location: Right arm   Patient Position: Sitting   Cuff Size: Adult   Pulse: 79   Resp: 17   Weight: 61.5 kg (135 lb 9.6 oz)   Height: 157.5 cm (62.01\")   PainSc: 0-No pain         Physical Exam:  Physical Exam  Vitals reviewed.   Constitutional:       Appearance: Normal appearance. She is well-developed.   HENT:      Head: Normocephalic and atraumatic.      Right Ear: External ear normal.      Left Ear: External ear normal.      Mouth/Throat:      Pharynx: No oropharyngeal exudate.   Eyes:      Conjunctiva/sclera: Conjunctivae normal.      Pupils: Pupils are equal, round, and reactive to light.   Cardiovascular:      Rate and Rhythm: Normal rate and regular rhythm.      Heart sounds: No murmur heard.    No friction rub. No gallop.   Pulmonary:      Effort: Pulmonary effort is normal.      Breath sounds: Normal breath sounds. No wheezing or rhonchi.   Chest:   Breasts:     Right: Normal. No mass, nipple discharge or skin change.      Left: Normal. No mass, nipple discharge or skin change.   Abdominal:      General: Bowel sounds are normal. There is no distension.      Palpations: Abdomen is soft.      Tenderness: There is no abdominal tenderness.   Lymphadenopathy:      Upper Body:      Right upper body: No axillary adenopathy.      Left upper body: No " axillary adenopathy.   Skin:     General: Skin is warm and dry.   Neurological:      Mental Status: She is alert and oriented to person, place, and time.      Cranial Nerves: No cranial nerve deficit.   Psychiatric:         Mood and Affect: Mood and affect normal.         Behavior: Behavior normal.         Thought Content: Thought content normal.         Judgment: Judgment normal.         Result Review      The following data was reviewed by: SOHAN Foreman on 01/30/2023:    Results for orders placed or performed in visit on 01/25/23   POCT SARS-CoV-2 Antigen JOSE D + Flu    Specimen: Swab   Result Value Ref Range    SARS Antigen Not Detected Not Detected, Presumptive Negative    Influenza A Antigen JOSE D Not Detected Not Detected    Influenza B Antigen JOSE D Not Detected Not Detected    Internal Control Passed Passed    Lot Number [id number]     Expiration Date 11-                Assessment and Plan:          Diagnoses and all orders for this visit:    1. Routine general medical examination at a health care facility (Primary)  Assessment & Plan:  Follow a healthy diet and get regular exercise.  Always wear seat belts.    Do monthly BSE, advised colon screening declined, reviewed EMD chart, return fasting for labs, set up mammogram, wants to wait until March, she is going to the [facility] next month       Orders:  -     Comprehensive Metabolic Panel; Future  -     CBC & Differential; Future  -     TSH; Future  -     Lipid Panel; Future    2. Encounter for screening mammogram for malignant neoplasm of breast  -     Mammo Screening Digital Tomosynthesis Bilateral With CAD; Future    3. Other emphysema (HCC)  Assessment & Plan:  Advised to quit smoking, send refills on her rx's       Orders:  -     albuterol sulfate  (90 Base) MCG/ACT inhaler; Inhale 2 puffs by mouth Every 4 (Four) Hours As Needed for Shortness of Air.  Dispense: 6.7 g; Refill: 1  -     Fluticasone-Salmeterol (ADVAIR/WIXELA) 100-50  MCG/ACT DISKUS; Inhale 1 puff 2 (Two) Times a Day.  Dispense: 60 each; Refill: 5        Follow Up   Return for fasting for labs.  Patient was given instructions and counseling regarding her condition or for health maintenance advice. Please see specific information pulled into the AVS if appropriate.

## 2023-02-01 ENCOUNTER — LAB (OUTPATIENT)
Dept: LAB | Facility: HOSPITAL | Age: 58
End: 2023-02-01
Payer: COMMERCIAL

## 2023-02-01 DIAGNOSIS — Z00.00 ROUTINE GENERAL MEDICAL EXAMINATION AT A HEALTH CARE FACILITY: ICD-10-CM

## 2023-02-01 LAB
ALBUMIN SERPL-MCNC: 4 G/DL (ref 3.5–5.2)
ALBUMIN/GLOB SERPL: 1.3 G/DL
ALP SERPL-CCNC: 71 U/L (ref 39–117)
ALT SERPL W P-5'-P-CCNC: 18 U/L (ref 1–33)
ANION GAP SERPL CALCULATED.3IONS-SCNC: 10.4 MMOL/L (ref 5–15)
AST SERPL-CCNC: 26 U/L (ref 1–32)
BASOPHILS # BLD AUTO: 0.11 10*3/MM3 (ref 0–0.2)
BASOPHILS NFR BLD AUTO: 1.5 % (ref 0–1.5)
BILIRUB SERPL-MCNC: 0.3 MG/DL (ref 0–1.2)
BUN SERPL-MCNC: 14 MG/DL (ref 6–20)
BUN/CREAT SERPL: 21.2 (ref 7–25)
CALCIUM SPEC-SCNC: 8.8 MG/DL (ref 8.6–10.5)
CHLORIDE SERPL-SCNC: 107 MMOL/L (ref 98–107)
CHOLEST SERPL-MCNC: 197 MG/DL (ref 0–200)
CO2 SERPL-SCNC: 24.6 MMOL/L (ref 22–29)
CREAT SERPL-MCNC: 0.66 MG/DL (ref 0.57–1)
DEPRECATED RDW RBC AUTO: 33.9 FL (ref 37–54)
EGFRCR SERPLBLD CKD-EPI 2021: 102.5 ML/MIN/1.73
EOSINOPHIL # BLD AUTO: 0.8 10*3/MM3 (ref 0–0.4)
EOSINOPHIL NFR BLD AUTO: 11.2 % (ref 0.3–6.2)
ERYTHROCYTE [DISTWIDTH] IN BLOOD BY AUTOMATED COUNT: 16 % (ref 12.3–15.4)
GLOBULIN UR ELPH-MCNC: 3.2 GM/DL
GLUCOSE SERPL-MCNC: 109 MG/DL (ref 65–99)
HCT VFR BLD AUTO: 37.2 % (ref 34–46.6)
HDLC SERPL-MCNC: 66 MG/DL (ref 40–60)
HGB BLD-MCNC: 11.5 G/DL (ref 12–15.9)
IMM GRANULOCYTES # BLD AUTO: 0.01 10*3/MM3 (ref 0–0.05)
IMM GRANULOCYTES NFR BLD AUTO: 0.1 % (ref 0–0.5)
LDLC SERPL CALC-MCNC: 121 MG/DL (ref 0–100)
LDLC/HDLC SERPL: 1.83 {RATIO}
LYMPHOCYTES # BLD AUTO: 1.98 10*3/MM3 (ref 0.7–3.1)
LYMPHOCYTES NFR BLD AUTO: 27.6 % (ref 19.6–45.3)
MCH RBC QN AUTO: 19.7 PG (ref 26.6–33)
MCHC RBC AUTO-ENTMCNC: 30.9 G/DL (ref 31.5–35.7)
MCV RBC AUTO: 63.6 FL (ref 79–97)
MONOCYTES # BLD AUTO: 0.41 10*3/MM3 (ref 0.1–0.9)
MONOCYTES NFR BLD AUTO: 5.7 % (ref 5–12)
NEUTROPHILS NFR BLD AUTO: 3.86 10*3/MM3 (ref 1.7–7)
NEUTROPHILS NFR BLD AUTO: 53.9 % (ref 42.7–76)
PLATELET # BLD AUTO: 275 10*3/MM3 (ref 140–450)
PMV BLD AUTO: 9.3 FL (ref 6–12)
POTASSIUM SERPL-SCNC: 4.4 MMOL/L (ref 3.5–5.2)
PROT SERPL-MCNC: 7.2 G/DL (ref 6–8.5)
RBC # BLD AUTO: 5.85 10*6/MM3 (ref 3.77–5.28)
SODIUM SERPL-SCNC: 142 MMOL/L (ref 136–145)
TRIGL SERPL-MCNC: 52 MG/DL (ref 0–150)
TSH SERPL DL<=0.05 MIU/L-ACNC: 1.01 UIU/ML (ref 0.27–4.2)
VLDLC SERPL-MCNC: 10 MG/DL (ref 5–40)
WBC NRBC COR # BLD: 7.17 10*3/MM3 (ref 3.4–10.8)

## 2023-02-01 PROCEDURE — 80061 LIPID PANEL: CPT

## 2023-02-01 PROCEDURE — 36415 COLL VENOUS BLD VENIPUNCTURE: CPT

## 2023-02-01 PROCEDURE — 80050 GENERAL HEALTH PANEL: CPT

## 2023-03-06 ENCOUNTER — OFFICE VISIT (OUTPATIENT)
Dept: FAMILY MEDICINE CLINIC | Age: 58
End: 2023-03-06
Payer: COMMERCIAL

## 2023-03-06 VITALS
OXYGEN SATURATION: 94 % | DIASTOLIC BLOOD PRESSURE: 69 MMHG | HEIGHT: 62 IN | SYSTOLIC BLOOD PRESSURE: 111 MMHG | TEMPERATURE: 98.7 F | RESPIRATION RATE: 16 BRPM | BODY MASS INDEX: 24.22 KG/M2 | HEART RATE: 86 BPM | WEIGHT: 131.6 LBS

## 2023-03-06 DIAGNOSIS — D50.8 OTHER IRON DEFICIENCY ANEMIA: ICD-10-CM

## 2023-03-06 DIAGNOSIS — R05.1 ACUTE COUGH: ICD-10-CM

## 2023-03-06 DIAGNOSIS — J02.9 SORE THROAT: Primary | ICD-10-CM

## 2023-03-06 DIAGNOSIS — E78.49 OTHER HYPERLIPIDEMIA: Primary | ICD-10-CM

## 2023-03-06 DIAGNOSIS — E78.49 OTHER HYPERLIPIDEMIA: ICD-10-CM

## 2023-03-06 PROBLEM — D64.9 ABSOLUTE ANEMIA: Status: ACTIVE | Noted: 2023-03-06

## 2023-03-06 LAB
EXPIRATION DATE: NORMAL
EXPIRATION DATE: NORMAL
FLUAV AG UPPER RESP QL IA.RAPID: NOT DETECTED
FLUBV AG UPPER RESP QL IA.RAPID: NOT DETECTED
INTERNAL CONTROL: NORMAL
INTERNAL CONTROL: NORMAL
Lab: NORMAL
Lab: NORMAL
S PYO AG THROAT QL: NEGATIVE
SARS-COV-2 AG UPPER RESP QL IA.RAPID: NOT DETECTED

## 2023-03-06 PROCEDURE — 87428 SARSCOV & INF VIR A&B AG IA: CPT | Performed by: NURSE PRACTITIONER

## 2023-03-06 PROCEDURE — 99214 OFFICE O/P EST MOD 30 MIN: CPT | Performed by: NURSE PRACTITIONER

## 2023-03-06 PROCEDURE — 87081 CULTURE SCREEN ONLY: CPT | Performed by: NURSE PRACTITIONER

## 2023-03-06 PROCEDURE — 87880 STREP A ASSAY W/OPTIC: CPT | Performed by: NURSE PRACTITIONER

## 2023-03-06 RX ORDER — DEXTROMETHORPHAN HYDROBROMIDE AND PROMETHAZINE HYDROCHLORIDE 15; 6.25 MG/5ML; MG/5ML
5 SYRUP ORAL NIGHTLY PRN
Qty: 120 ML | Refills: 0 | Status: SHIPPED | OUTPATIENT
Start: 2023-03-06

## 2023-03-06 NOTE — ASSESSMENT & PLAN NOTE
Rest, increase fluids, follow up if symptoms progress or change   RSS: negative, culture pending  Warm salt water gargles:

## 2023-03-06 NOTE — PROGRESS NOTES
Lisa Luna presents to [facility].    Chief Complaint:  Results (Discuss lab results) and Sore Throat (Cough, sore throat, /Denies congestion, fever, headache, nausea, vomiting, diarrhea, shortness of breath, and chest pain./Started 3 days ago)         History of Present Illness:  Hyperlipidemia  Current medication: none   Tries to follow a healthy diet   Recent Glucose: 109 (ate late the night before)   Recent Hm.5  Lab Results       Component                Value               Date                       CHOL                     197                 2023                 CHLPL                    202 (H)             2021                 TRIG                     52                  2023                 HDL                      66 (H)              2023                 LDL                      121 (H)             2023              returned from [facility] last week on 3-2-23    URI  When did symptoms started: 3 days ago   Any exposures: not that she is aware of   Symptoms: ST and dry cough   Treatment tried: none   Would like a rx cough rx    PAST MEDICAL HISTORY changes since 2023:      covid +     COPD/smoker        GYNECOLOGICAL HISTORY:        Menopause at age 48.          PREVENTIVE HEALTH MAINTENANCE             COLORECTAL CANCER SCREENING: declines colorectal cancer screening, understands reason for testing     Hepatitis C Medicare Screening: was last done 19; negative     MAMMOGRAM: was last done 20 with normal results, scheduled for 4-    PAP SMEAR: was last done  with normal results         Surgical History:     NONE         Family History:          Father:  at age 76;  lymphoma     Mother:  at age 75     Daughter(s): 2 daughter(s) total;  Type 2 Diabetes         Social History: [facility]     Occupation: a [employer].   TBKY works second shift /also works [employer] part time     Marital Status:  "     Children: 2 children              Review of Systems:  Review of Systems   Constitutional: Negative for fever.   Respiratory: Negative for shortness of breath.    Cardiovascular: Negative for chest pain.          Current Outpatient Medications:   •  albuterol sulfate  (90 Base) MCG/ACT inhaler, Inhale 2 puffs by mouth Every 4 (Four) Hours As Needed for Shortness of Air., Disp: 6.7 g, Rfl: 1  •  ferrous sulfate 324 MG tablet delayed-release, Take 1 tablet by mouth Daily With Breakfast., Disp: 30 tablet, Rfl: 1  •  Fluticasone-Salmeterol (ADVAIR/WIXELA) 100-50 MCG/ACT DISKUS, Inhale 1 puff 2 (Two) Times a Day., Disp: 60 each, Rfl: 5  •  promethazine-dextromethorphan (PROMETHAZINE-DM) 6.25-15 MG/5ML syrup, Take 5mLs by mouth At Night As Needed for Cough., Disp: 120 mL, Rfl: 0    Vital Signs:   Vitals:    03/06/23 1129   BP: 111/69   BP Location: Right arm   Patient Position: Sitting   Cuff Size: Adult   Pulse: 86   Resp: 16   Temp: 98.7 °F (37.1 °C)   TempSrc: Oral   SpO2: 94%  Comment: room air   Weight: 59.7 kg (131 lb 9.6 oz)   Height: 157.5 cm (62.01\")   PainSc:   7   PainLoc: Throat         Physical Exam:  Physical Exam  Constitutional:       General: She is not in acute distress.     Appearance: Normal appearance.   HENT:      Right Ear: Tympanic membrane, ear canal and external ear normal.      Left Ear: Tympanic membrane, ear canal and external ear normal.      Nose: Nose normal.      Mouth/Throat:      Pharynx: Posterior oropharyngeal erythema (mild ) present. No oropharyngeal exudate.   Cardiovascular:      Rate and Rhythm: Normal rate and regular rhythm.      Heart sounds: No murmur heard.  Pulmonary:      Effort: Pulmonary effort is normal.      Breath sounds: Normal breath sounds.   Lymphadenopathy:      Cervical: No cervical adenopathy.   Neurological:      Mental Status: She is alert.   Psychiatric:         Mood and Affect: Mood normal.         Behavior: Behavior normal.         Result " Review      The following data was reviewed by: SOHAN Foreman on 03/06/2023:    Results for orders placed or performed in visit on 03/06/23   POCT rapid strep A    Specimen: Swab   Result Value Ref Range    Rapid Strep A Screen Negative Negative, VALID, INVALID, Not Performed    Internal Control Passed Passed    Lot Number [id number]     Expiration Date 5/31/24    POCT SARS-CoV-2 Antigen JOSE D + Flu    Specimen: Swab   Result Value Ref Range    SARS Antigen Not Detected Not Detected, Presumptive Negative    Influenza A Antigen JOSE D Not Detected Not Detected    Influenza B Antigen JOSE D Not Detected Not Detected    Internal Control Passed Passed    Lot Number [id number]     Expiration Date 11/19/23                Assessment and Plan:          Diagnoses and all orders for this visit:    1. Sore throat (Primary)  Assessment & Plan:  Rest, increase fluids, follow up if symptoms progress or change   RSS: negative, culture pending  Warm salt water gargles:     Orders:  -     POCT rapid strep A  -     Beta Strep Culture, Throat - , Throat; Future  -     Beta Strep Culture, Throat - Swab, Throat    2. Other hyperlipidemia  Assessment & Plan:  Repeat labs in 2 months will get cbc and htn panel, to work on diet and regular exercise and reduce sugar and other simple carbs       3. Acute cough  Assessment & Plan:  Rapid covid and flu screen negative/ Use mucinex / mucinex DM during the day, can try zyrtec     Orders:  -     POCT SARS-CoV-2 Antigen JOSE D + Flu  -     promethazine-dextromethorphan (PROMETHAZINE-DM) 6.25-15 MG/5ML syrup; Take 5mLs by mouth At Night As Needed for Cough.  Dispense: 120 mL; Refill: 0    4. Other iron deficiency anemia  Assessment & Plan:  Mildly low hmg, will recheck lab in 2 months from last lab/ she denies any blood in stool           Follow Up   Return if symptoms worsen or fail to improve.  Patient was given instructions and counseling regarding her condition or for health maintenance advice.  Please see specific information pulled into the AVS if appropriate.

## 2023-03-06 NOTE — ASSESSMENT & PLAN NOTE
Repeat labs in 2 months will get cbc and htn panel, to work on diet and regular exercise and reduce sugar and other simple carbs

## 2023-03-08 LAB — BACTERIA SPEC AEROBE CULT: NORMAL

## 2023-04-10 ENCOUNTER — HOSPITAL ENCOUNTER (OUTPATIENT)
Dept: MAMMOGRAPHY | Facility: HOSPITAL | Age: 58
Discharge: HOME | End: 2023-04-10
Admitting: NURSE PRACTITIONER
Payer: COMMERCIAL

## 2023-04-10 DIAGNOSIS — Z12.31 ENCOUNTER FOR SCREENING MAMMOGRAM FOR MALIGNANT NEOPLASM OF BREAST: ICD-10-CM

## 2023-04-10 PROCEDURE — 77063 BREAST TOMOSYNTHESIS BI: CPT

## 2023-04-10 PROCEDURE — 77067 SCR MAMMO BI INCL CAD: CPT

## 2023-04-26 ENCOUNTER — TELEPHONE (OUTPATIENT)
Dept: FAMILY MEDICINE CLINIC | Age: 58
End: 2023-04-26
Payer: COMMERCIAL

## 2023-04-26 NOTE — TELEPHONE ENCOUNTER
----- Message from Nora Jones LPN sent at 4/10/2023  8:11 AM EDT -----      ----- Message -----  From: SYSTEM  Sent: 4/8/2023   1:25 AM EDT  To: [facility] [place] [facility] [place]

## 2023-05-08 ENCOUNTER — LAB (OUTPATIENT)
Dept: LAB | Facility: HOSPITAL | Age: 58
End: 2023-05-08
Payer: COMMERCIAL

## 2023-05-08 DIAGNOSIS — J43.8 OTHER EMPHYSEMA: ICD-10-CM

## 2023-05-08 DIAGNOSIS — E78.49 OTHER HYPERLIPIDEMIA: ICD-10-CM

## 2023-05-08 DIAGNOSIS — D50.8 OTHER IRON DEFICIENCY ANEMIA: ICD-10-CM

## 2023-05-08 LAB
ALBUMIN SERPL-MCNC: 4 G/DL (ref 3.5–5.2)
ALBUMIN/GLOB SERPL: 1.2 G/DL
ALP SERPL-CCNC: 75 U/L (ref 39–117)
ALT SERPL W P-5'-P-CCNC: 14 U/L (ref 1–33)
ANION GAP SERPL CALCULATED.3IONS-SCNC: 9.4 MMOL/L (ref 5–15)
AST SERPL-CCNC: 21 U/L (ref 1–32)
BASOPHILS # BLD AUTO: 0.09 10*3/MM3 (ref 0–0.2)
BASOPHILS NFR BLD AUTO: 1.6 % (ref 0–1.5)
BILIRUB SERPL-MCNC: 0.2 MG/DL (ref 0–1.2)
BUN SERPL-MCNC: 15 MG/DL (ref 6–20)
BUN/CREAT SERPL: 23.1 (ref 7–25)
CALCIUM SPEC-SCNC: 9.1 MG/DL (ref 8.6–10.5)
CHLORIDE SERPL-SCNC: 105 MMOL/L (ref 98–107)
CHOLEST SERPL-MCNC: 173 MG/DL (ref 0–200)
CO2 SERPL-SCNC: 26.6 MMOL/L (ref 22–29)
CREAT SERPL-MCNC: 0.65 MG/DL (ref 0.57–1)
DEPRECATED RDW RBC AUTO: 35.9 FL (ref 37–54)
EGFRCR SERPLBLD CKD-EPI 2021: 102.2 ML/MIN/1.73
EOSINOPHIL # BLD AUTO: 0.42 10*3/MM3 (ref 0–0.4)
EOSINOPHIL NFR BLD AUTO: 7.5 % (ref 0.3–6.2)
ERYTHROCYTE [DISTWIDTH] IN BLOOD BY AUTOMATED COUNT: 17.6 % (ref 12.3–15.4)
GLOBULIN UR ELPH-MCNC: 3.4 GM/DL
GLUCOSE SERPL-MCNC: 111 MG/DL (ref 65–99)
HCT VFR BLD AUTO: 37.4 % (ref 34–46.6)
HDLC SERPL-MCNC: 59 MG/DL (ref 40–60)
HGB BLD-MCNC: 11.5 G/DL (ref 12–15.9)
IMM GRANULOCYTES # BLD AUTO: 0.01 10*3/MM3 (ref 0–0.05)
IMM GRANULOCYTES NFR BLD AUTO: 0.2 % (ref 0–0.5)
LDLC SERPL CALC-MCNC: 99 MG/DL (ref 0–100)
LDLC/HDLC SERPL: 1.66 {RATIO}
LYMPHOCYTES # BLD AUTO: 1.87 10*3/MM3 (ref 0.7–3.1)
LYMPHOCYTES NFR BLD AUTO: 33.3 % (ref 19.6–45.3)
MCH RBC QN AUTO: 19.8 PG (ref 26.6–33)
MCHC RBC AUTO-ENTMCNC: 30.7 G/DL (ref 31.5–35.7)
MCV RBC AUTO: 64.5 FL (ref 79–97)
MONOCYTES # BLD AUTO: 0.34 10*3/MM3 (ref 0.1–0.9)
MONOCYTES NFR BLD AUTO: 6.1 % (ref 5–12)
NEUTROPHILS NFR BLD AUTO: 2.88 10*3/MM3 (ref 1.7–7)
NEUTROPHILS NFR BLD AUTO: 51.3 % (ref 42.7–76)
PLATELET # BLD AUTO: 273 10*3/MM3 (ref 140–450)
PMV BLD AUTO: 9.8 FL (ref 6–12)
POTASSIUM SERPL-SCNC: 4.6 MMOL/L (ref 3.5–5.2)
PROT SERPL-MCNC: 7.4 G/DL (ref 6–8.5)
RBC # BLD AUTO: 5.8 10*6/MM3 (ref 3.77–5.28)
SODIUM SERPL-SCNC: 141 MMOL/L (ref 136–145)
TRIGL SERPL-MCNC: 81 MG/DL (ref 0–150)
VLDLC SERPL-MCNC: 15 MG/DL (ref 5–40)
WBC NRBC COR # BLD: 5.61 10*3/MM3 (ref 3.4–10.8)

## 2023-05-08 PROCEDURE — 85025 COMPLETE CBC W/AUTO DIFF WBC: CPT

## 2023-05-08 PROCEDURE — 80061 LIPID PANEL: CPT

## 2023-05-08 PROCEDURE — 36415 COLL VENOUS BLD VENIPUNCTURE: CPT

## 2023-05-08 PROCEDURE — 80053 COMPREHEN METABOLIC PANEL: CPT

## 2023-05-08 RX ORDER — ALBUTEROL SULFATE 90 UG/1
2 AEROSOL, METERED RESPIRATORY (INHALATION) EVERY 4 HOURS PRN
Qty: 6.7 G | Refills: 1 | Status: SHIPPED | OUTPATIENT
Start: 2023-05-08

## 2023-07-31 ENCOUNTER — OFFICE VISIT (OUTPATIENT)
Dept: FAMILY MEDICINE CLINIC | Age: 58
End: 2023-07-31
Payer: COMMERCIAL

## 2023-07-31 VITALS
OXYGEN SATURATION: 93 % | WEIGHT: 137 LBS | HEIGHT: 62 IN | BODY MASS INDEX: 25.21 KG/M2 | DIASTOLIC BLOOD PRESSURE: 82 MMHG | HEART RATE: 85 BPM | SYSTOLIC BLOOD PRESSURE: 118 MMHG

## 2023-07-31 DIAGNOSIS — J43.8 OTHER EMPHYSEMA: Primary | ICD-10-CM

## 2023-07-31 PROCEDURE — 99213 OFFICE O/P EST LOW 20 MIN: CPT | Performed by: NURSE PRACTITIONER

## 2023-07-31 RX ORDER — FLUTICASONE PROPIONATE AND SALMETEROL 100; 50 UG/1; UG/1
1 POWDER RESPIRATORY (INHALATION) 2 TIMES DAILY
Qty: 60 EACH | Refills: 5 | Status: SHIPPED | OUTPATIENT
Start: 2023-07-31

## 2023-07-31 RX ORDER — ALBUTEROL SULFATE 90 UG/1
2 AEROSOL, METERED RESPIRATORY (INHALATION) EVERY 4 HOURS PRN
Qty: 6.7 G | Refills: 1 | Status: SHIPPED | OUTPATIENT
Start: 2023-07-31

## 2023-09-18 DIAGNOSIS — J43.8 OTHER EMPHYSEMA: ICD-10-CM

## 2023-09-18 RX ORDER — ALBUTEROL SULFATE 90 UG/1
2 AEROSOL, METERED RESPIRATORY (INHALATION) EVERY 4 HOURS PRN
Qty: 8 G | Refills: 1 | Status: SHIPPED | OUTPATIENT
Start: 2023-09-18

## 2023-09-18 RX ORDER — FLUTICASONE PROPIONATE AND SALMETEROL 100; 50 UG/1; UG/1
1 POWDER RESPIRATORY (INHALATION) 2 TIMES DAILY
Qty: 60 EACH | Refills: 5 | Status: SHIPPED | OUTPATIENT
Start: 2023-09-18

## 2023-09-18 RX ORDER — ALBUTEROL SULFATE 90 UG/1
AEROSOL, METERED RESPIRATORY (INHALATION)
Qty: 9 G | Refills: 1 | Status: SHIPPED | OUTPATIENT
Start: 2023-09-18 | End: 2023-09-18 | Stop reason: SDUPTHER

## 2023-09-18 NOTE — TELEPHONE ENCOUNTER
Rx Refill Note  Requested Prescriptions     Pending Prescriptions Disp Refills    albuterol sulfate  (90 Base) MCG/ACT inhaler [Pharmacy Med Name: Albuterol Sulfate  (90 Base) MCG/ACT Inhalation Aerosol Solution] 9 g 1     Sig: INHALE 2 PUFFS BY MOUTH EVERY 4 HOURS AS NEEDED FOR SHORTNESS OF BREATH      Last office visit with prescribing clinician: 7/31/2023   Last telemedicine visit with prescribing clinician: Visit date not found   Next office visit with prescribing clinician: 1/31/2024      Aide Ruby LPN  09/18/23, 11:32 EDT

## 2023-11-07 DIAGNOSIS — J43.8 OTHER EMPHYSEMA: ICD-10-CM

## 2023-11-07 RX ORDER — ALBUTEROL SULFATE 90 UG/1
2 AEROSOL, METERED RESPIRATORY (INHALATION) EVERY 4 HOURS PRN
Qty: 9 G | Refills: 0 | Status: SHIPPED | OUTPATIENT
Start: 2023-11-07

## 2023-12-20 DIAGNOSIS — J43.8 OTHER EMPHYSEMA: ICD-10-CM

## 2023-12-20 RX ORDER — ALBUTEROL SULFATE 90 UG/1
2 AEROSOL, METERED RESPIRATORY (INHALATION) EVERY 4 HOURS PRN
Qty: 9 G | Refills: 0 | Status: SHIPPED | OUTPATIENT
Start: 2023-12-20

## 2024-02-05 ENCOUNTER — LAB (OUTPATIENT)
Dept: LAB | Facility: HOSPITAL | Age: 59
End: 2024-02-05
Payer: COMMERCIAL

## 2024-02-05 ENCOUNTER — OFFICE VISIT (OUTPATIENT)
Dept: FAMILY MEDICINE CLINIC | Age: 59
End: 2024-02-05
Payer: COMMERCIAL

## 2024-02-05 VITALS
HEART RATE: 85 BPM | HEIGHT: 62 IN | BODY MASS INDEX: 25.1 KG/M2 | OXYGEN SATURATION: 96 % | DIASTOLIC BLOOD PRESSURE: 65 MMHG | TEMPERATURE: 98 F | WEIGHT: 136.4 LBS | SYSTOLIC BLOOD PRESSURE: 115 MMHG

## 2024-02-05 DIAGNOSIS — L29.9 ITCHY SKIN: ICD-10-CM

## 2024-02-05 DIAGNOSIS — J43.8 OTHER EMPHYSEMA: ICD-10-CM

## 2024-02-05 DIAGNOSIS — Z00.00 ROUTINE GENERAL MEDICAL EXAMINATION AT A HEALTH CARE FACILITY: Primary | ICD-10-CM

## 2024-02-05 DIAGNOSIS — Z00.00 ROUTINE GENERAL MEDICAL EXAMINATION AT A HEALTH CARE FACILITY: ICD-10-CM

## 2024-02-05 LAB
ALBUMIN SERPL-MCNC: 4.3 G/DL (ref 3.5–5.2)
ALBUMIN/GLOB SERPL: 1.4 G/DL
ALP SERPL-CCNC: 78 U/L (ref 39–117)
ALT SERPL W P-5'-P-CCNC: 16 U/L (ref 1–33)
ANION GAP SERPL CALCULATED.3IONS-SCNC: 5 MMOL/L (ref 5–15)
AST SERPL-CCNC: 22 U/L (ref 1–32)
BASOPHILS # BLD AUTO: 0.09 10*3/MM3 (ref 0–0.2)
BASOPHILS NFR BLD AUTO: 1.5 % (ref 0–1.5)
BILIRUB SERPL-MCNC: 0.4 MG/DL (ref 0–1.2)
BUN SERPL-MCNC: 11 MG/DL (ref 6–20)
BUN/CREAT SERPL: 15.7 (ref 7–25)
CALCIUM SPEC-SCNC: 9.4 MG/DL (ref 8.6–10.5)
CHLORIDE SERPL-SCNC: 108 MMOL/L (ref 98–107)
CHOLEST SERPL-MCNC: 202 MG/DL (ref 0–200)
CO2 SERPL-SCNC: 27 MMOL/L (ref 22–29)
CREAT SERPL-MCNC: 0.7 MG/DL (ref 0.57–1)
DEPRECATED RDW RBC AUTO: 34.5 FL (ref 37–54)
EGFRCR SERPLBLD CKD-EPI 2021: 100.4 ML/MIN/1.73
EOSINOPHIL # BLD AUTO: 0.48 10*3/MM3 (ref 0–0.4)
EOSINOPHIL NFR BLD AUTO: 7.9 % (ref 0.3–6.2)
ERYTHROCYTE [DISTWIDTH] IN BLOOD BY AUTOMATED COUNT: 16.4 % (ref 12.3–15.4)
GLOBULIN UR ELPH-MCNC: 3.1 GM/DL
GLUCOSE SERPL-MCNC: 102 MG/DL (ref 65–99)
HCT VFR BLD AUTO: 39 % (ref 34–46.6)
HDLC SERPL-MCNC: 63 MG/DL (ref 40–60)
HGB BLD-MCNC: 11.8 G/DL (ref 12–15.9)
IMM GRANULOCYTES # BLD AUTO: 0 10*3/MM3 (ref 0–0.05)
IMM GRANULOCYTES NFR BLD AUTO: 0 % (ref 0–0.5)
LDLC SERPL CALC-MCNC: 122 MG/DL (ref 0–100)
LDLC/HDLC SERPL: 1.9 {RATIO}
LYMPHOCYTES # BLD AUTO: 2.25 10*3/MM3 (ref 0.7–3.1)
LYMPHOCYTES NFR BLD AUTO: 37 % (ref 19.6–45.3)
MCH RBC QN AUTO: 19.5 PG (ref 26.6–33)
MCHC RBC AUTO-ENTMCNC: 30.3 G/DL (ref 31.5–35.7)
MCV RBC AUTO: 64.5 FL (ref 79–97)
MONOCYTES # BLD AUTO: 0.36 10*3/MM3 (ref 0.1–0.9)
MONOCYTES NFR BLD AUTO: 5.9 % (ref 5–12)
NEUTROPHILS NFR BLD AUTO: 2.9 10*3/MM3 (ref 1.7–7)
NEUTROPHILS NFR BLD AUTO: 47.7 % (ref 42.7–76)
PLATELET # BLD AUTO: 283 10*3/MM3 (ref 140–450)
PMV BLD AUTO: 10 FL (ref 6–12)
POTASSIUM SERPL-SCNC: 4.4 MMOL/L (ref 3.5–5.2)
PROT SERPL-MCNC: 7.4 G/DL (ref 6–8.5)
RBC # BLD AUTO: 6.05 10*6/MM3 (ref 3.77–5.28)
SODIUM SERPL-SCNC: 140 MMOL/L (ref 136–145)
TRIGL SERPL-MCNC: 96 MG/DL (ref 0–150)
TSH SERPL DL<=0.05 MIU/L-ACNC: 0.75 UIU/ML (ref 0.27–4.2)
VLDLC SERPL-MCNC: 17 MG/DL (ref 5–40)
WBC NRBC COR # BLD AUTO: 6.08 10*3/MM3 (ref 3.4–10.8)

## 2024-02-05 PROCEDURE — 36415 COLL VENOUS BLD VENIPUNCTURE: CPT

## 2024-02-05 PROCEDURE — 80050 GENERAL HEALTH PANEL: CPT

## 2024-02-05 PROCEDURE — 80061 LIPID PANEL: CPT

## 2024-02-05 PROCEDURE — 99396 PREV VISIT EST AGE 40-64: CPT | Performed by: NURSE PRACTITIONER

## 2024-02-05 RX ORDER — TRIAMCINOLONE ACETONIDE 1 MG/G
1 OINTMENT TOPICAL 2 TIMES DAILY
Qty: 30 G | Refills: 0 | Status: SHIPPED | OUTPATIENT
Start: 2024-02-05

## 2024-02-05 RX ORDER — ALBUTEROL SULFATE 90 UG/1
2 AEROSOL, METERED RESPIRATORY (INHALATION) EVERY 4 HOURS PRN
Qty: 6.7 G | Refills: 5 | Status: SHIPPED | OUTPATIENT
Start: 2024-02-05

## 2024-02-05 NOTE — PROGRESS NOTES
Chief Complaint  Annual Exam    Subjective          Lisa Luna presents to [facility]    History of Present Illness  General Health Questions  Regular exercise as least 3 days a week: yes  Follows a healthy diet: tries   Wears seat belt always: yes   Drinks Alcohol: rarely  Uses Recreational drugs: none   Uses tobacco products: yes 4-5 per day   UTD on Tetanus vaccine: 22  Does BSE:yes   Last mammogram: 4-  Last colon screen: declines     PAST MEDICAL HISTORY changes since :      covid +     COPD/smoker        GYNECOLOGICAL HISTORY:        Menopause at age 48.          PREVENTIVE HEALTH MAINTENANCE             COLORECTAL CANCER SCREENING: declines colorectal cancer screening, understands reason for testing     Hepatitis C Medicare Screening: was last done 19; negative       PAP SMEAR: was last done 2018 with normal results and neg HPV         Surgical History:     NONE         Family History:          Father:  at age 76;  lymphoma     Mother:  at age 75     Daughter(s): 2 daughter(s) total;  Type 2 Diabetes         Social History: [employer]     Occupation: a [employer].   [employer] works second shift /also works [employer] part time     Marital Status:      Children: 2 children                    History reviewed. No pertinent past medical history.    Allergies   Allergen Reactions    Erythromycin Rash        History reviewed. No pertinent surgical history.     Social History     Tobacco Use    Smoking status: Every Day     Packs/day: 0.25     Years: 40.00     Additional pack years: 0.00     Total pack years: 10.00     Types: Cigarettes     Passive exposure: Never    Smokeless tobacco: Never   Substance Use Topics    Alcohol use: Never       History reviewed. No pertinent family history.     Health Maintenance Due   Topic Date Due    COLORECTAL CANCER SCREENING  Never done    Pneumococcal Vaccine 0-64 (1 of 2 - PCV) Never done    ZOSTER  VACCINE (1 of 2) Never done    PAP SMEAR  07/01/2021        Current Outpatient Medications on File Prior to Visit   Medication Sig    Fluticasone-Salmeterol (ADVAIR/WIXELA) 100-50 MCG/ACT DISKUS Inhale 1 puff 2 (Two) Times a Day.    [DISCONTINUED] albuterol sulfate  (90 Base) MCG/ACT inhaler INHALE 2 PUFFS BY MOUTH EVERY 4 HOURS AS NEEDED FOR SHORTNESS OF BREATH    ferrous sulfate 324 MG tablet delayed-release Take 1 tablet by mouth Daily With Breakfast. (Patient not taking: Reported on 2/5/2024)     No current facility-administered medications on file prior to visit.       Immunization History   Administered Date(s) Administered    COVID-19 (MODERNA) 1st,2nd,3rd Dose Monovalent 01/15/2021, 03/23/2021, 04/24/2021, 01/13/2022    Flu Vaccine Quad PF 6-35MO 01/15/2022    Fluzone (or Fluarix & Flulaval for VFC) >6mos 10/28/2022, 10/23/2023    Tdap 01/25/2022       Review of Systems   Constitutional:  Negative for fatigue and fever.   HENT:  Negative for ear pain and sore throat.    Eyes:  Negative for blurred vision.   Respiratory:  Positive for wheezing (at times with exertion, needs her albuterol refilled to our pharmacy). Negative for cough and shortness of breath.    Cardiovascular:  Negative for chest pain, palpitations and leg swelling.   Gastrointestinal:  Negative for abdominal pain, blood in stool, constipation, diarrhea, nausea and vomiting.   Genitourinary:  Negative for vaginal bleeding.   Musculoskeletal:  Negative for arthralgias and myalgias.   Skin:  Negative for rash.        Itchy skin, wants a refill  of steroid ointment to use as needed    Neurological:  Negative for dizziness, weakness and headache.   Psychiatric/Behavioral:  Negative for sleep disturbance and depressed mood.         Objective     Vitals:    02/05/24 0835   BP: 115/65   BP Location: Left arm   Patient Position: Sitting   Cuff Size: Adult   Pulse: 85   Temp: 98 °F (36.7 °C)   TempSrc: Oral   SpO2: 96%   Weight: 61.9 kg (136 lb  "6.4 oz)   Height: 157.5 cm (62\")            Physical Exam  Vitals reviewed.   Constitutional:       General: She is not in acute distress.     Appearance: Normal appearance. She is well-developed.   HENT:      Head: Normocephalic. Hair is normal.      Right Ear: Hearing, tympanic membrane, ear canal and external ear normal. No decreased hearing noted. No drainage.      Left Ear: Hearing, tympanic membrane, ear canal and external ear normal. No decreased hearing noted.      Nose: Nose normal. No nasal deformity.      Mouth/Throat:      Mouth: Mucous membranes are moist.   Eyes:      General: Lids are normal.      Extraocular Movements: Extraocular movements intact.      Conjunctiva/sclera: Conjunctivae normal.      Pupils: Pupils are equal, round, and reactive to light.   Neck:      Thyroid: No thyromegaly.      Vascular: No carotid bruit or JVD.   Cardiovascular:      Rate and Rhythm: Normal rate and regular rhythm.      Pulses: Normal pulses.      Heart sounds: Normal heart sounds. No murmur heard.     No friction rub. No gallop.   Pulmonary:      Effort: Pulmonary effort is normal. No respiratory distress.      Breath sounds: Normal breath sounds. No wheezing.   Chest:   Breasts:     Right: Normal. No mass, nipple discharge or skin change.      Left: Normal. No mass, nipple discharge or skin change.   Abdominal:      General: Bowel sounds are normal.      Palpations: Abdomen is soft. There is no mass.      Tenderness: There is no abdominal tenderness.   Musculoskeletal:         General: No tenderness or deformity. Normal range of motion.      Cervical back: Normal range of motion and neck supple.   Lymphadenopathy:      Cervical: No cervical adenopathy.      Upper Body:      Right upper body: No axillary adenopathy.      Left upper body: No axillary adenopathy.   Skin:     General: Skin is warm and dry.      Findings: No erythema or rash.   Neurological:      Mental Status: She is alert and oriented to person, " place, and time.      Motor: No abnormal muscle tone.      Gait: Gait normal.      Deep Tendon Reflexes: Reflexes are normal and symmetric.   Psychiatric:         Mood and Affect: Mood normal.         Behavior: Behavior normal.         Thought Content: Thought content normal.         Judgment: Judgment normal.         Result Review :     The following data was reviewed by: SOHAN Foreman on 02/05/2024:                       Assessment and Plan      Diagnoses and all orders for this visit:    1. Routine general medical examination at a health care facility (Primary)  Assessment & Plan:  Declines colon cancer screening, declines flu and covid vaccine updates today, declines pap smear today, declines scheduling mammogram, today, she will get that scheduled after her next appt; Follow a healthy diet and get regular exercise.  Always wear seat belts.    Sending to the lab today.     Orders:  -     Comprehensive Metabolic Panel; Future  -     CBC & Differential; Future  -     TSH; Future  -     Lipid Panel; Future    2. Other emphysema  Assessment & Plan:  Advised to quit smoking, refilled her inhaler today     Orders:  -     albuterol sulfate  (90 Base) MCG/ACT inhaler; Inhale 2 puffs Every 4 (Four) Hours As Needed for Shortness of Air.  Dispense: 6.7 g; Refill: 5    3. Itchy skin  Assessment & Plan:  To use only occasionally     Orders:  -     triamcinolone (KENALOG) 0.1 % ointment; Apply 1 Application topically to the appropriate area as directed 2 (Two) Times a Day.  Dispense: 30 g; Refill: 0                    Follow Up     Return for followup pending lab results.    Patient was given instructions and counseling regarding her condition or for health maintenance advice. Please see specific information pulled into the AVS if appropriate.

## 2024-02-05 NOTE — ASSESSMENT & PLAN NOTE
Declines colon cancer screening, declines flu and covid vaccine updates today, declines pap smear today, declines scheduling mammogram, today, she will get that scheduled after her next appt; Follow a healthy diet and get regular exercise.  Always wear seat belts.    Sending to the lab today.

## 2024-03-12 ENCOUNTER — OFFICE VISIT (OUTPATIENT)
Dept: FAMILY MEDICINE CLINIC | Age: 59
End: 2024-03-12
Payer: COMMERCIAL

## 2024-03-12 VITALS
BODY MASS INDEX: 25.4 KG/M2 | OXYGEN SATURATION: 95 % | HEIGHT: 62 IN | HEART RATE: 84 BPM | SYSTOLIC BLOOD PRESSURE: 137 MMHG | TEMPERATURE: 98.1 F | DIASTOLIC BLOOD PRESSURE: 82 MMHG | WEIGHT: 138 LBS

## 2024-03-12 DIAGNOSIS — R05.1 ACUTE COUGH: ICD-10-CM

## 2024-03-12 DIAGNOSIS — J43.9 ACUTE EXACERBATION OF EMPHYSEMA: Primary | ICD-10-CM

## 2024-03-12 PROCEDURE — 87428 SARSCOV & INF VIR A&B AG IA: CPT | Performed by: PHYSICIAN ASSISTANT

## 2024-03-12 PROCEDURE — 99213 OFFICE O/P EST LOW 20 MIN: CPT | Performed by: PHYSICIAN ASSISTANT

## 2024-03-12 RX ORDER — PREDNISONE 20 MG/1
40 TABLET ORAL DAILY
Qty: 10 TABLET | Refills: 0 | Status: SHIPPED | OUTPATIENT
Start: 2024-03-12 | End: 2024-03-17

## 2024-03-12 RX ORDER — DEXTROMETHORPHAN HYDROBROMIDE AND PROMETHAZINE HYDROCHLORIDE 15; 6.25 MG/5ML; MG/5ML
5 SYRUP ORAL 4 TIMES DAILY PRN
Qty: 120 ML | Refills: 0 | Status: SHIPPED | OUTPATIENT
Start: 2024-03-12

## 2024-03-12 NOTE — PROGRESS NOTES
Subjective     CHIEF COMPLAINT    Chief Complaint   Patient presents with    Cough     Onset last night.     Shortness of Breath            History of Present Illness  This is a 58-year-old female presenting to the clinic complaining of cough and shortness of breath for the last day or 2.  She also has some associated chest pain only when she coughs.  She has not had any fever, chills, sinus congestion, body aches or headaches.  She denies any known sick contacts.            Review of Systems   Constitutional:  Negative for chills, fatigue and fever.   HENT:  Negative for rhinorrhea and sore throat.    Respiratory:  Positive for cough and shortness of breath. Negative for wheezing.    Cardiovascular:  Positive for chest pain (with coughing).   Gastrointestinal:  Negative for abdominal pain, diarrhea, nausea and vomiting.   Musculoskeletal:  Negative for myalgias.   Skin:  Negative for rash.   Neurological:  Negative for headaches.            History reviewed. No pertinent past medical history.         History reviewed. No pertinent surgical history.         History reviewed. No pertinent family history.         Social History     Socioeconomic History    Marital status:    Tobacco Use    Smoking status: Every Day     Current packs/day: 0.25     Average packs/day: 0.3 packs/day for 40.0 years (10.0 ttl pk-yrs)     Types: Cigarettes     Passive exposure: Never    Smokeless tobacco: Never   Vaping Use    Vaping status: Never Used   Substance and Sexual Activity    Alcohol use: Never    Drug use: Never    Sexual activity: Defer            Allergies   Allergen Reactions    Erythromycin Rash            Current Outpatient Medications on File Prior to Visit   Medication Sig Dispense Refill    albuterol sulfate  (90 Base) MCG/ACT inhaler Inhale 2 puffs Every 4 (Four) Hours As Needed for Shortness of Air. 6.7 g 5    ferrous sulfate 324 MG tablet delayed-release Take 1 tablet by mouth Daily With Breakfast. 30  "tablet 1    Fluticasone-Salmeterol (ADVAIR/WIXELA) 100-50 MCG/ACT DISKUS Inhale 1 puff 2 (Two) Times a Day. 60 each 5    triamcinolone (KENALOG) 0.1 % ointment Apply 1 Application topically to the appropriate area as directed 2 (Two) Times a Day. 30 g 0     No current facility-administered medications on file prior to visit.            /82   Pulse 84   Temp 98.1 °F (36.7 °C)   Ht 157.5 cm (62.01\")   Wt 62.6 kg (138 lb)   SpO2 95% Comment: room air  BMI 25.23 kg/m²          Objective     Physical Exam  Vitals and nursing note reviewed.   Constitutional:       General: She is not in acute distress.     Appearance: Normal appearance.   HENT:      Head: Normocephalic and atraumatic.      Right Ear: Tympanic membrane, ear canal and external ear normal.      Left Ear: Tympanic membrane, ear canal and external ear normal.      Nose: No congestion or rhinorrhea.      Mouth/Throat:      Mouth: Mucous membranes are moist.      Pharynx: Oropharynx is clear. No posterior oropharyngeal erythema.   Eyes:      Extraocular Movements: Extraocular movements intact.      Conjunctiva/sclera: Conjunctivae normal.      Pupils: Pupils are equal, round, and reactive to light.   Cardiovascular:      Rate and Rhythm: Normal rate and regular rhythm.      Heart sounds: Normal heart sounds.   Pulmonary:      Effort: Pulmonary effort is normal. No respiratory distress.      Breath sounds: Wheezing present. No rhonchi or rales.      Comments: Distant breath sounds bilaterally  Musculoskeletal:      Cervical back: Normal range of motion. No rigidity.   Skin:     General: Skin is warm and dry.   Neurological:      Mental Status: She is alert and oriented to person, place, and time.   Psychiatric:         Mood and Affect: Mood normal.         Behavior: Behavior normal.                    Lab Results (last 24 hours)       Procedure Component Value Units Date/Time    POCT SARS-CoV-2 Antigen JOSE D + Flu [[id number]] Collected: 03/12/24 1226    " Specimen: Swab Updated: 03/12/24 1226     SARS Antigen Not Detected     Influenza A Antigen JOSE D Not Detected     Influenza B Antigen JOSE D Not Detected     Internal Control Passed     Lot Number [id number]     Expiration Date 9/20/24                          Assessment & Plan  Acute exacerbation of emphysema  Exam is consistent with emphysema exacerbation.  Will treat with prednisone and Promethazine DM as below.  Can consider adding antibiotics if symptoms worsen.  She should return to the clinic if she experiences worsening shortness of breath or does not feel like she is improving with the steroids.  Acute cough      Orders Placed This Encounter   Procedures    POCT SARS-CoV-2 Antigen JOSE D + Flu     New Medications Ordered This Visit   Medications    predniSONE (DELTASONE) 20 MG tablet     Sig: Take 2 tablets by mouth Daily for 5 days.     Dispense:  10 tablet     Refill:  0    promethazine-dextromethorphan (PROMETHAZINE-DM) 6.25-15 MG/5ML syrup     Sig: Take 5 mL by mouth 4 (Four) Times a Day As Needed for Cough.     Dispense:  120 mL     Refill:  0                FOR FULL DISCHARGE INSTRUCTIONS/COMMENTS/HANDOUTS please see the   AVS

## 2024-03-12 NOTE — LETTER
March 12, 2024     Patient: Lisa Luna   YOB: 1965   Date of Visit: 3/12/2024       To Whom It May Concern:    It is my medical opinion that iLsa Luna may return to work on 3/13/2024.         Sincerely,          Ani Ridley PA-C    CC: No Recipients

## 2024-06-21 DIAGNOSIS — J43.9 ACUTE EXACERBATION OF EMPHYSEMA: ICD-10-CM

## 2024-06-21 RX ORDER — PREDNISONE 20 MG/1
40 TABLET ORAL DAILY
Qty: 10 TABLET | Refills: 0 | OUTPATIENT
Start: 2024-06-21 | End: 2024-06-26

## 2024-06-21 RX ORDER — DEXTROMETHORPHAN HYDROBROMIDE AND PROMETHAZINE HYDROCHLORIDE 15; 6.25 MG/5ML; MG/5ML
5 SYRUP ORAL 4 TIMES DAILY PRN
Qty: 120 ML | Refills: 0 | OUTPATIENT
Start: 2024-06-21

## 2024-06-21 NOTE — TELEPHONE ENCOUNTER
Caller: Lisa Luna    Relationship: Self    Best call back number: [phone]     Requested Prescriptions:   Requested Prescriptions     Pending Prescriptions Disp Refills    promethazine-dextromethorphan (PROMETHAZINE-DM) 6.25-15 MG/5ML syrup 120 mL 0     Sig: Take 5 mL by mouth 4 (Four) Times a Day As Needed for Cough.    predniSONE (DELTASONE) 20 MG tablet 10 tablet 0     Sig: Take 2 tablets by mouth Daily for 5 days.        Pharmacy where request should be sent: [address] [place] [address] - [phone]  - [phone] FX     Last office visit with prescribing clinician: 2/5/2024   Last telemedicine visit with prescribing clinician: Visit date not found   Next office visit with prescribing clinician: 8/5/2024     Additional details provided by patient: PATIENT IS OUT OF TOWN AND OUT OF MEDICATIONS    Does the patient have less than a 3 day supply:  [x] Yes  [] No        Armani Mireles Rep   06/21/24 08:36 EDT

## 2024-06-21 NOTE — TELEPHONE ENCOUNTER
Caller: cadence Luna    Relationship to patient: Emergency Contact    Best call back number: [phone]    Patient is needing: PATIENTS DAUGHTER CALLED REQUESTING IF THE REFILL FOR BOTH THE PROMETHAZINE AND ALSO HER PREDNISONE COULD BE [facility] [place]. PATIENTS DAUGHTER STATES HER MOM IS COMPLETELY OUT OF BOTH OF THESE MEDICATIONS.

## 2024-06-21 NOTE — TELEPHONE ENCOUNTER
Caller: Lisa Luna    Relationship: Self    Best call back number: [phone]    What was the call regarding:   PATIENT STATES SHE WANTS THE MEDICATION SENT TO THE [facility].     PHARMACY: [facility]

## 2024-06-24 ENCOUNTER — TELEPHONE (OUTPATIENT)
Dept: FAMILY MEDICINE CLINIC | Age: 59
End: 2024-06-24
Payer: COMMERCIAL

## 2024-06-24 ENCOUNTER — READMISSION MANAGEMENT (OUTPATIENT)
Dept: CALL CENTER | Facility: HOSPITAL | Age: 59
End: 2024-06-24
Payer: COMMERCIAL

## 2024-06-24 NOTE — OUTREACH NOTE
Prep Survey      Flowsheet Row Responses   [facility] facility patient discharged from? Non-BH   Is LACE score < 7 ? Non- Discharge   [facility]   Date of Admission 06/21/24   Date of Discharge 06/24/24   Discharge Disposition Home or Self Care   Discharge diagnosis COPD exacerbation (HCC)   Acute respiratory failure with hypoxia (HCC)   Pneumonia of both lower lobes due to infectious organism   Community acquired pneumonia, bilateral   Does the patient have one of the following disease processes/diagnoses(primary or secondary)? Pneumonia   Prep survey completed? Yes            Paula RAHMAN - Registered Nurse

## 2024-06-24 NOTE — TELEPHONE ENCOUNTER
Caller: GINA    Relationship to patient: Child    Best call back number: [phone]    New or established patient?  [] New  [x] Established    Date of discharge: 06/24/2024    Facility discharged from: [facility] [place] [facility]    Diagnosis/Symptoms: N/A    Length of stay (If applicable): N/A    Specialty Only: Did you see a Protestant health provider?    [] Yes  [x] No  If so, who? PATIENT WAS SEEN AT [facility] IN [facility] FOR RHINOVIRUS THAT DEVELOPED INTO PNEUMONIA. SHE HAD COMPLICATIONS FROM HER COPD AND IS BEING SENT HOME WITH OXYGEN TANK. HOSPITAL FOLLOW UP IS SCHEDULED FOR 06/26/24 WEDNESDAY.    HOSPITAL STAFF CALLED TODAY 06/25/2024 AND STATED PATIENT IS ACTUALLY DISCHARGING TODAY AND CONFIRMED APPT FOR TOMORROW FOR PATIENT.

## 2024-06-25 ENCOUNTER — TRANSITIONAL CARE MANAGEMENT TELEPHONE ENCOUNTER (OUTPATIENT)
Dept: CALL CENTER | Facility: HOSPITAL | Age: 59
End: 2024-06-25
Payer: COMMERCIAL

## 2024-06-25 ENCOUNTER — TELEPHONE (OUTPATIENT)
Dept: FAMILY MEDICINE CLINIC | Age: 59
End: 2024-06-25
Payer: COMMERCIAL

## 2024-06-25 ENCOUNTER — READMISSION MANAGEMENT (OUTPATIENT)
Dept: CALL CENTER | Facility: HOSPITAL | Age: 59
End: 2024-06-25
Payer: COMMERCIAL

## 2024-06-25 NOTE — TELEPHONE ENCOUNTER
call came from outside hospital/Dr Fabian, she will be discharged and has a follow up appt here with me tomorrow : has COPD, multi focal pneumonia and bacteremia ( rhinovirus and strep present) saw ID, getting another culture today; needs trans thoracic echo, sending home on Keflex for 7 days, needs follow up CXR in 4-6 weeks due to history of latent TB and needs pulm function testing and a lung cancer screening test

## 2024-06-25 NOTE — OUTREACH NOTE
Call Center TCM Note      Flowsheet Row Responses   [facility] patient discharged from? Non-  [[facility]]   Does the patient have one of the following disease processes/diagnoses(primary or secondary)? Other   TCM attempt successful? No  [VR for Drew]   Unsuccessful attempts Attempt 1   Call Status Left message            Day Morocho RN    6/25/2024, 09:08 EDT

## 2024-06-25 NOTE — OUTREACH NOTE
Prep Survey      Flowsheet Row Responses   Anabaptism facility patient discharged from? Non-BH   Is LACE score < 7 ? Non- Discharge   Eligibility [facility]   Date of Discharge 06/25/24   Discharge Disposition Home or Self Care   Discharge diagnosis COPD exacerbation (HCC)   Acute respiratory failure with hypoxia (HCC)   Pneumonia of both lower lobes due to infectious organism   Community acquired pneumonia, bilateral   Does the patient have one of the following disease processes/diagnoses(primary or secondary)? Pneumonia   Prep survey completed? Yes            Paula RAHMAN - Registered Nurse

## 2024-06-26 ENCOUNTER — OFFICE VISIT (OUTPATIENT)
Dept: FAMILY MEDICINE CLINIC | Age: 59
End: 2024-06-26
Payer: COMMERCIAL

## 2024-06-26 VITALS
DIASTOLIC BLOOD PRESSURE: 74 MMHG | WEIGHT: 134.8 LBS | TEMPERATURE: 98.1 F | SYSTOLIC BLOOD PRESSURE: 133 MMHG | BODY MASS INDEX: 24.8 KG/M2 | OXYGEN SATURATION: 89 % | HEART RATE: 74 BPM | HEIGHT: 62 IN

## 2024-06-26 DIAGNOSIS — J18.9 PNEUMONIA OF BOTH LOWER LOBES DUE TO INFECTIOUS ORGANISM: Primary | ICD-10-CM

## 2024-06-26 DIAGNOSIS — Z86.15 HISTORY OF LATENT TUBERCULOSIS: ICD-10-CM

## 2024-06-26 DIAGNOSIS — R78.81 BACTEREMIA: ICD-10-CM

## 2024-06-26 DIAGNOSIS — J44.0 COPD WITH LOWER RESPIRATORY INFECTION: ICD-10-CM

## 2024-06-26 DIAGNOSIS — Z87.891 HISTORY OF CIGARETTE SMOKING: ICD-10-CM

## 2024-06-26 DIAGNOSIS — J96.01 ACUTE RESPIRATORY FAILURE WITH HYPOXIA: ICD-10-CM

## 2024-06-26 PROBLEM — J13 PNEUMONIA OF BOTH LOWER LOBES DUE TO STREPTOCOCCUS PNEUMONIAE: Status: ACTIVE | Noted: 2024-06-26

## 2024-06-26 PROCEDURE — 99495 TRANSJ CARE MGMT MOD F2F 14D: CPT | Performed by: NURSE PRACTITIONER

## 2024-06-26 RX ORDER — CEPHALEXIN 500 MG/1
500 CAPSULE ORAL 4 TIMES DAILY
COMMUNITY
Start: 2024-06-25 | End: 2024-07-02

## 2024-06-26 RX ORDER — MOMETASONE FUROATE AND FORMOTEROL FUMARATE DIHYDRATE 100; 5 UG/1; UG/1
2 AEROSOL RESPIRATORY (INHALATION)
COMMUNITY
Start: 2024-06-25 | End: 2024-07-25

## 2024-06-26 RX ORDER — DOXYCYCLINE HYCLATE 100 MG/1
100 CAPSULE ORAL
COMMUNITY
Start: 2024-06-25 | End: 2024-06-27

## 2024-06-26 RX ORDER — TIOTROPIUM BROMIDE INHALATION SPRAY 3.12 UG/1
2 SPRAY, METERED RESPIRATORY (INHALATION)
COMMUNITY
Start: 2024-06-26 | End: 2024-07-26

## 2024-06-26 RX ORDER — PREDNISONE 20 MG/1
40 TABLET ORAL DAILY
COMMUNITY
Start: 2024-06-26 | End: 2024-06-28

## 2024-06-26 NOTE — OUTREACH NOTE
Call Center TCM Note      Flowsheet Row Responses   [facility] patient discharged from? Non-BH   Does the patient have one of the following disease processes/diagnoses(primary or secondary)? Pneumonia   TCM attempt successful? Yes   Revoked Reason Other  [Per records, pt was seen today (6/26/24) by her PCP for her TCM/Hosp dc fu apt - the apt satisfies the TCM requirements]   Discharge diagnosis COPD exacerbation (HCC)   Acute respiratory failure with hypoxia (HCC)   Pneumonia of both lower lobes due to infectious organism   Community acquired pneumonia, bilateral            Jocelynn Gale RN    6/26/2024, 13:44 EDT

## 2024-06-26 NOTE — ASSESSMENT & PLAN NOTE
Will send to Pulm, repeat CXR in 4 weeks, continue to abstain from smoking and take all her rx's as prescribed

## 2024-06-26 NOTE — LETTER
June 26, 2024     Patient: Lisa Luna   YOB: 1965   Date of Visit: 6/26/2024       To Whom It May Concern:    It is my medical opinion that Lisa Luna be excused from work from 06/21/2024 to 07/14/2024. She may return to work on 07/15/2024.      Sincerely,        Lelo Correa, SOHAN/[facility]    CC: No Recipients

## 2024-06-26 NOTE — ASSESSMENT & PLAN NOTE
Advised, continued rest, get adequate fluids/and diet, she reports not having much of an appetite.  Reviewed discharge summary and discussed with patient and her spouse, she may require a colonoscopy or TTE depending on type of Streptococcus bacteremia she has. She will also require a repeat CXR in 4-6 weeks given her history of LTBI. In addition to this, she should also plan to get outpatient pulmonary function tests and would benefit from a lung cancer screening.   Checked with her pharamcy, all her rx's are ready and they will go pick them up.  To take all her rx's as directed.  To continue to abstain from smoking.  Use oxygen as needed.  Discussed getting a pulse oximeter.  Will set up pulmonology appt to do PFT's, may get a Chest CT low dose for screening, will repeat CXR in 4 weeks, waiting to hear back from ID regarding further testing   Off work 6-21-24 through 7-14-24, RTW 7-15-24   negative...

## 2024-06-26 NOTE — PROGRESS NOTES
Transitional Care Follow Up Visit  Subjective     Lisa Luna is a 59 y.o. female who presents for a transitional care management visit.      Her  is with her today    Within 48 business hours after discharge our office contacted her via telephone to coordinate her care and needs.      I reviewed and discussed the details of that call along with the discharge summary, hospital problems, inpatient lab results, inpatient diagnostic studies, and consultation reports with Lisa.     Current outpatient and discharge medications have been reconciled for the patient.  Reviewed by: SOHAN Foreman          6/24/2024     2:27 PM 6/25/2024     2:18 PM   Date of TCM Phone Call   [facility], [facility]   Date of Admission 6/21/2024    Date of Discharge 6/24/2024 6/25/2024   Discharge Disposition Home or Self Care Home or Self Care       Risk for Readmission (LACE) No data recorded    History of Present Illness  FABRICIO  Admitted:6-21-24  Discharged:6-25-24  Condition:improved   Now on 1L oxygen at    She has not gotten any of her rx's from [facility] yet that she was discharged with. She is doing spirometry to help with her breathing.  She has not smoked since discharge and does not need the Nicoderm patch, she does not like them;   (Symptoms started last week on Thursday 6-20-24 into Friday, had been to [place] and then came home and was taking care of her 7 month old grandson who was sick, then went up to [place] for work, when her symptoms flared and went to a [facility] then was sent to the hospital)     Dg:   COPD exacerbation (HCC)   Acute respiratory failure with hypoxia (HCC)   Pneumonia of both lower lobes due to infectious organism   Community acquired pneumonia, bilateral     Hospital note:   Lisa Luna is a 59 y.o. female with a past medical history significant for chronic obstructive pulmonary disease (no O2 at baseline), smoking,  iron deficiency anemia who presented due to shortness of breath/satting in 70s - admitting for acute hypoxic resp failure, COPDe, multifocal PNA, and lactic acidosis. In the ED, was tachycardic. Labs were significant for lactic acidosis to 3.30, potassium of 3.1, white count of 14.41. Hypoxia and lactic acidosis on ABG. D-dimer, cardiac enzymes, COVID/flu/RSV all negative. Chest x-ray showing infiltrate within the right lower lobes and computed tomographic angiography negative for pulmonary embolism but was showing ground-glass bilateral lower lobe opacities and extensive consolidation in the right middle lobe - multifocal pneumonia and bronchiectasis. Lactic acidosis has resolved. Positive for rhinovirus.    Pulm/CC consulted 2/2 elevated O2 req, at one time requiring BIPAP. Broadened antibiotics to Vancomycin, Cefepime, and Doxycycline and put on high dose IV Solumedrol. On LABA/ICS. MRSA nares negative and clinical improvement notes, so antibiotics were deescalated to to Doxy 100 BID until 6/28 and steroids were de-escalated to Prednisone 40 QID. On 06/24, Blood cultures returned positive for streptococcus. She was restarted on Ceftriaxone at that time and infectious disease was consulted. Specialization of streptococcus has not returned from micro, expect results likely 06/26. Repeat blood cx drawn 06/25 prior to discharge. She has had significant clinical improvement and is now able to converse on RA, still requiring O2 with exertion. She has been approved for home O2. She will also be discharged on triple therapy of Dulera and Spiriva. Smoking cessation has been recommended and patient will be sent home with nicotine replacement.     Lisa Luna is stable to be discharged home on Keflex 500 mg po QID x 7 days. She is to follow up with her PCP, Lelo Correa, tomorrow 06/26. Our infectious disease team will be communicating results of blood cultures and will provide their recommendations accordingly - Lisa  may require a colonoscopy or TTE depending on type of Streptococcus bacteremia she has. She will also require a repeat CXR in 4-6 weeks given her history of LTBI. In addition to this, she should also plan to get outpatient pulmonary function tests and would benefit from a lung cancer screening. This plan was communicated telephonically with Lelo Correa who voiced understanding and will plan to address this in follow up.       PAST MEDICAL HISTORY changes since 2024:        Hospitalized:     pneumonia    covid +     COPD/smoker        GYNECOLOGICAL HISTORY:        Menopause at age 48.          PREVENTIVE HEALTH MAINTENANCE             COLORECTAL CANCER SCREENING: declines colorectal cancer screening, understands reason for testing     Hepatitis C Medicare Screening: was last done 19; negative       PAP SMEAR: was last done 2018 with normal results and neg HPV         Surgical History:       NONE         Family History:       Father:  at age 76;  lymphoma     Mother:  at age 75     Daughter(s): 2 daughter(s) total;  Type 2 Diabetes         Social History: [employer]       Occupation: a [employer].   TBKY works second shift /also works [employer] part time (off work now)     Marital Status:      Children: 2 children                      Course During Hospital Stay(Copied from D/C Summary and reviewed during encounter on 2024 by SOHAN Foreman):      The following portions of the patient's history were reviewed and updated as appropriate: allergies, current medications, past family history, past medical history, past social history, past surgical history, and problem list.      Current Outpatient Medications:     albuterol sulfate  (90 Base) MCG/ACT inhaler, Inhale 2 puffs Every 4 (Four) Hours As Needed for Shortness of Air., Disp: 6.7 g, Rfl: 5    mometasone-formoterol (Dulera) 100-5 MCG/ACT inhaler, Inhale 2 puffs., Disp: , Rfl:     O2 (OXYGEN), Inhale  "1 L/min Every Night., Disp: , Rfl:     predniSONE (DELTASONE) 20 MG tablet, Take 2 tablets by mouth Daily., Disp: , Rfl:     tiotropium bromide monohydrate (Spiriva Respimat) 2.5 MCG/ACT aerosol solution inhaler, Inhale 2 puffs., Disp: , Rfl:     triamcinolone (KENALOG) 0.1 % ointment, Apply 1 Application topically to the appropriate area as directed 2 (Two) Times a Day., Disp: 30 g, Rfl: 0    cephalexin (KEFLEX) 500 MG capsule, Take 1 capsule by mouth 4 (Four) Times a Day. (Patient not taking: Reported on 6/26/2024), Disp: , Rfl:     doxycycline (VIBRAMYCIN) 100 MG capsule, Take 1 capsule by mouth. (Patient not taking: Reported on 6/26/2024), Disp: , Rfl:     ferrous sulfate 324 MG tablet delayed-release, Take 1 tablet by mouth Daily With Breakfast. (Patient not taking: Reported on 6/26/2024), Disp: 30 tablet, Rfl: 1    Fluticasone-Salmeterol (ADVAIR/WIXELA) 100-50 MCG/ACT DISKUS, Inhale 1 puff 2 (Two) Times a Day. (Patient not taking: Reported on 6/26/2024), Disp: 60 each, Rfl: 5    promethazine-dextromethorphan (PROMETHAZINE-DM) 6.25-15 MG/5ML syrup, Take 5 mL by mouth 4 (Four) Times a Day As Needed for Cough. (Patient not taking: Reported on 6/26/2024), Disp: 120 mL, Rfl: 0     Vitals:    06/26/24 0825   BP: 133/74   BP Location: Right arm   Patient Position: Sitting   Cuff Size: Large Adult   Pulse: 74   Temp: 98.1 °F (36.7 °C)   TempSrc: Oral   SpO2: (!) 89%   Weight: 61.1 kg (134 lb 12.8 oz)   Height: 157.5 cm (62.01\")       Advance Care Planning     Review of Systems   Constitutional:  Negative for fever.   Respiratory:  Positive for cough (occ) and shortness of breath (slightly, improved). Negative for wheezing (no longer).    Cardiovascular:  Negative for chest pain and leg swelling.        Objective   Physical Exam  Vitals reviewed.   Constitutional:       General: She is not in acute distress.     Appearance: Normal appearance.   Neck:      Vascular: No carotid bruit.   Cardiovascular:      Rate and " Rhythm: Normal rate and regular rhythm.      Heart sounds: Normal heart sounds. No murmur heard.  Pulmonary:      Effort: Pulmonary effort is normal. No respiratory distress.      Breath sounds: Normal breath sounds.   Musculoskeletal:      Right lower leg: No edema.      Left lower leg: No edema.   Neurological:      Mental Status: She is alert.   Psychiatric:         Mood and Affect: Mood normal.         Behavior: Behavior normal.         DATA REVIEWED:             Assessment & Plan     Diagnoses and all orders for this visit:    1. Pneumonia of both lower lobes due to infectious organism (Primary)  Assessment & Plan:  Waiting to hear back from ID     Orders:  -     XR Chest 2 View; Future    2. COPD with lower respiratory infection  Assessment & Plan:  Will send to Pulm, repeat CXR in 4 weeks, continue to abstain from smoking and take all her rx's as prescribed         3. Acute respiratory failure with hypoxia  Assessment & Plan:  Advised, continued rest, get adequate fluids/and diet, she reports not having much of an appetite.  Reviewed discharge summary and discussed with patient and her spouse, she may require a colonoscopy or TTE depending on type of Streptococcus bacteremia she has. She will also require a repeat CXR in 4-6 weeks given her history of LTBI. In addition to this, she should also plan to get outpatient pulmonary function tests and would benefit from a lung cancer screening.   Checked with her pharamcy, all her rx's are ready and they will go pick them up.  To take all her rx's as directed.  To continue to abstain from smoking.  Use oxygen as needed.  Discussed getting a pulse oximeter.  Will set up pulmonology appt to do PFT's, may get a Chest CT low dose for screening, will repeat CXR in 4 weeks, waiting to hear back from ID regarding further testing   Off work 6-21-24 through 7-14-24, RTW 7-15-24      4. Bacteremia  Assessment & Plan:  Waiting to hear back from ID, take all her rx's as  directed       5. History of latent tuberculosis  Assessment & Plan:  Repeat CXR in 4 weeks     Orders:  -     XR Chest 2 View; Future    6. History of cigarette smoking  Assessment & Plan:  May get a CT chest low dose, for screening; Started smoking at age 18 years, 5-6 cig per day           Follow Up      Return if symptoms worsen or fail to improve, for and follow up in 4 weeks for CXR .

## 2024-07-01 ENCOUNTER — TELEPHONE (OUTPATIENT)
Dept: FAMILY MEDICINE CLINIC | Age: 59
End: 2024-07-01
Payer: COMMERCIAL

## 2024-07-08 ENCOUNTER — LAB (OUTPATIENT)
Dept: LAB | Facility: HOSPITAL | Age: 59
End: 2024-07-08
Payer: COMMERCIAL

## 2024-07-08 ENCOUNTER — OFFICE VISIT (OUTPATIENT)
Dept: FAMILY MEDICINE CLINIC | Age: 59
End: 2024-07-08
Payer: COMMERCIAL

## 2024-07-08 VITALS
HEART RATE: 77 BPM | HEIGHT: 62 IN | SYSTOLIC BLOOD PRESSURE: 113 MMHG | OXYGEN SATURATION: 96 % | BODY MASS INDEX: 25.28 KG/M2 | WEIGHT: 137.4 LBS | DIASTOLIC BLOOD PRESSURE: 79 MMHG | TEMPERATURE: 97.9 F

## 2024-07-08 DIAGNOSIS — D50.8 OTHER IRON DEFICIENCY ANEMIA: Primary | ICD-10-CM

## 2024-07-08 DIAGNOSIS — D50.8 OTHER IRON DEFICIENCY ANEMIA: ICD-10-CM

## 2024-07-08 DIAGNOSIS — J43.8 OTHER EMPHYSEMA: ICD-10-CM

## 2024-07-08 DIAGNOSIS — Z86.15 HISTORY OF LATENT TUBERCULOSIS: ICD-10-CM

## 2024-07-08 LAB
BASOPHILS # BLD AUTO: 0.13 10*3/MM3 (ref 0–0.2)
BASOPHILS NFR BLD AUTO: 2.1 % (ref 0–1.5)
DEPRECATED RDW RBC AUTO: 36.5 FL (ref 37–54)
EOSINOPHIL # BLD AUTO: 0.45 10*3/MM3 (ref 0–0.4)
EOSINOPHIL NFR BLD AUTO: 7.2 % (ref 0.3–6.2)
ERYTHROCYTE [DISTWIDTH] IN BLOOD BY AUTOMATED COUNT: 15.8 % (ref 12.3–15.4)
HCT VFR BLD AUTO: 36.2 % (ref 34–46.6)
HGB BLD-MCNC: 10.6 G/DL (ref 12–15.9)
IMM GRANULOCYTES # BLD AUTO: 0.02 10*3/MM3 (ref 0–0.05)
IMM GRANULOCYTES NFR BLD AUTO: 0.3 % (ref 0–0.5)
LYMPHOCYTES # BLD AUTO: 2.13 10*3/MM3 (ref 0.7–3.1)
LYMPHOCYTES NFR BLD AUTO: 34.1 % (ref 19.6–45.3)
MCH RBC QN AUTO: 19.4 PG (ref 26.6–33)
MCHC RBC AUTO-ENTMCNC: 29.3 G/DL (ref 31.5–35.7)
MCV RBC AUTO: 66.2 FL (ref 79–97)
MONOCYTES # BLD AUTO: 0.51 10*3/MM3 (ref 0.1–0.9)
MONOCYTES NFR BLD AUTO: 8.2 % (ref 5–12)
NEUTROPHILS NFR BLD AUTO: 3.01 10*3/MM3 (ref 1.7–7)
NEUTROPHILS NFR BLD AUTO: 48.1 % (ref 42.7–76)
PLATELET # BLD AUTO: 323 10*3/MM3 (ref 140–450)
PMV BLD AUTO: 9.8 FL (ref 6–12)
RBC # BLD AUTO: 5.47 10*6/MM3 (ref 3.77–5.28)
WBC NRBC COR # BLD AUTO: 6.25 10*3/MM3 (ref 3.4–10.8)

## 2024-07-08 PROCEDURE — 85025 COMPLETE CBC W/AUTO DIFF WBC: CPT

## 2024-07-08 PROCEDURE — 36415 COLL VENOUS BLD VENIPUNCTURE: CPT

## 2024-07-08 PROCEDURE — 99214 OFFICE O/P EST MOD 30 MIN: CPT | Performed by: NURSE PRACTITIONER

## 2024-07-08 NOTE — ASSESSMENT & PLAN NOTE
Reviewed her labs, she is no longer taking iron, causes her to be constipated.  Reviewed labs, will recheck her labs, she can RTW tomorrow pending labs

## 2024-07-08 NOTE — LETTER
July 8, 2024     Patient: Lisa Luna   YOB: 1965   Date of Visit: 7/8/2024       To Whom It May Concern:    It is my medical opinion that Lisa Luna may return to work 7/09/2024.         Sincerely,        SOHAN Foreman    CC: No Recipients

## 2024-07-08 NOTE — PROGRESS NOTES
Lisa Luna presents to [facility].    Chief Complaint:  Follow-up (She wants to discuss her return to work date.)         History of Present Illness:  Follow up on her COPD exacerbation  She is ready to return to work.  Complete her ATB, is no longer on oxygen, and using just one of the pulm rx's.  She is not smoking.     On 24:   call came from outside hospital/Dr Fabian, she will be discharged and has a follow up appt here with me tomorrow : has COPD, multi focal pneumonia and bacteremia ( rhinovirus and strep present) saw ID, getting another culture today; needs trans thoracic echo, sending home on Keflex for 7 days, needs follow up CXR in 4-6 weeks due to history of latent TB and needs pulm function testing and a lung cancer screening test     PAST MEDICAL HISTORY changes since 2024:         Hospitalized:      pneumonia     covid +     COPD/smoker        GYNECOLOGICAL HISTORY:        Menopause at age 48.          PREVENTIVE HEALTH MAINTENANCE             COLORECTAL CANCER SCREENING: declines colorectal cancer screening, understands reason for testing     Hepatitis C Medicare Screening: was last done 19; negative       PAP SMEAR: was last done 2018 with normal results and neg HPV         Surgical History:        NONE         Family History:       Father:  at age 76;  lymphoma     Mother:  at age 75     Daughter(s): 2 daughter(s) total;  Type 2 Diabetes         Social History: [facility]        Occupation: a factory.   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   Respiratory:  Negative for cough and shortness of breath.    Cardiovascular:  Negative for chest pain, palpitations and leg swelling.   Gastrointestinal:         Eating and drinking without  "difficulty           Current Outpatient Medications:     albuterol sulfate  (90 Base) MCG/ACT inhaler, Inhale 2 puffs Every 4 (Four) Hours As Needed for Shortness of Air., Disp: 6.7 g, Rfl: 5    mometasone-formoterol (Dulera) 100-5 MCG/ACT inhaler, Inhale 2 puffs., Disp: , Rfl:     triamcinolone (KENALOG) 0.1 % ointment, Apply 1 Application topically to the appropriate area as directed 2 (Two) Times a Day., Disp: 30 g, Rfl: 0    O2 (OXYGEN), Inhale 1 L/min Every Night. (Patient not taking: Reported on 7/8/2024), Disp: , Rfl:     Vital Signs:   Vitals:    07/08/24 0906   BP: 113/79   BP Location: Left arm   Patient Position: Sitting   Cuff Size: Large Adult   Pulse: 77   Temp: 97.9 °F (36.6 °C)   TempSrc: Oral   SpO2: 96%   Weight: 62.3 kg (137 lb 6.4 oz)   Height: 157.5 cm (62.01\")              Physical Exam:  Physical Exam  Vitals reviewed.   Constitutional:       General: She is not in acute distress.     Appearance: Normal appearance.   Neck:      Vascular: No carotid bruit.   Cardiovascular:      Rate and Rhythm: Normal rate and regular rhythm.      Heart sounds: Normal heart sounds. No murmur heard.  Pulmonary:      Effort: Pulmonary effort is normal. No respiratory distress.      Breath sounds: Normal breath sounds.   Musculoskeletal:      Right lower leg: No edema.      Left lower leg: No edema.   Neurological:      Mental Status: She is alert.   Psychiatric:         Mood and Affect: Mood normal.         Behavior: Behavior normal.         Result Review      The following data was reviewed by: SOHAN Foreman on 07/08/2024:    Results for orders placed or performed in visit on 03/12/24   POCT SARS-CoV-2 Antigen JOSE D + Flu    Specimen: Swab   Result Value Ref Range    SARS Antigen Not Detected Not Detected, Presumptive Negative    Influenza A Antigen JOSE D Not Detected Not Detected    Influenza B Antigen JOSE D Not Detected Not Detected    Internal Control Passed Passed    Lot Number [id number]     " Expiration Date 9/20/24                Assessment and Plan:          Diagnoses and all orders for this visit:    1. Other iron deficiency anemia (Primary)  Assessment & Plan:  Reviewed her labs, she is no longer taking iron, causes her to be constipated.  Reviewed labs, will recheck her labs, she can RTW tomorrow pending labs    Orders:  -     CBC w AUTO Differential; Future    2. Other emphysema  Assessment & Plan:  Repeat CXR later this month, congratulated her on smoking cessation         3. History of latent tuberculosis  Assessment & Plan:  Repeat CXR due later this month, order is pending                     Follow Up   Return if symptoms worsen or fail to improve, for follow up pending x-ray result, followup pending lab results.  Patient was given instructions and counseling regarding her condition or for health maintenance advice. Please see specific information pulled into the AVS if appropriate.

## 2024-07-15 DIAGNOSIS — J43.8 OTHER EMPHYSEMA: ICD-10-CM

## 2024-07-15 RX ORDER — FLUTICASONE PROPIONATE AND SALMETEROL 100; 50 UG/1; UG/1
1 POWDER RESPIRATORY (INHALATION) 2 TIMES DAILY
Qty: 180 EACH | Refills: 0 | OUTPATIENT
Start: 2024-07-15

## 2024-07-15 RX ORDER — MOMETASONE FUROATE AND FORMOTEROL FUMARATE DIHYDRATE 100; 5 UG/1; UG/1
2 AEROSOL RESPIRATORY (INHALATION)
Qty: 8.8 G | Refills: 0 | Status: SHIPPED | OUTPATIENT
Start: 2024-07-15 | End: 2024-08-14

## 2024-07-16 ENCOUNTER — TELEPHONE (OUTPATIENT)
Dept: FAMILY MEDICINE CLINIC | Age: 59
End: 2024-07-16
Payer: COMMERCIAL

## 2024-07-16 NOTE — TELEPHONE ENCOUNTER
PA FOR DULERA:  DO YOU WANT TO CHANGE TO ONE OF THE BELOW MEDS OR PA DULERA?    MEDICATION PRIOR AUTH REQUIREMENT *  Albuterol HFA (Par) Not Required  Arnuity Ellipta Not Required  Budesonide-Formoterol Fumarate Dihydrate Not Required  Fluticasone Furoate-Vilanterol Not Required  Fluticasone Propionate HFA Not Required  QVAR Redihaler Not Required  Tiotropium bromide Not Required  Advair HFA Required  AirDuo RespiClick Required  Asmanex HFA Required  Bevespi Aerosphere Required  Pulmicort Flexhaler Required  *  The alternatives and PA Requirements listed here are based on available third-party data and may not be the same for all plans. Please check the patient's specific plan.

## 2024-07-17 NOTE — TELEPHONE ENCOUNTER
Recently, you or your doctor   asked us to review a request for the medication, DULERA 100 MCG-5 MCG INHALER,   and we have approved it for coverage. This approval is effective from 07/16/2024 until   07/16/2025. This approval means that, based on the information given to us, the   medication is considered medically necessary and it’s not excluded under your plan.   This approval is for the coverage of DULERA 100 MCG-5 MCG INHALER only.

## 2024-07-24 ENCOUNTER — TELEPHONE (OUTPATIENT)
Dept: FAMILY MEDICINE CLINIC | Age: 59
End: 2024-07-24
Payer: COMMERCIAL

## 2024-07-24 NOTE — TELEPHONE ENCOUNTER
----- Message from Anu PICKARD sent at 6/26/2024 10:09 AM EDT -----  Chest xray in 4 week, order already placed

## 2024-08-26 ENCOUNTER — TELEPHONE (OUTPATIENT)
Dept: FAMILY MEDICINE CLINIC | Age: 59
End: 2024-08-26
Payer: COMMERCIAL

## 2024-09-11 ENCOUNTER — TELEPHONE (OUTPATIENT)
Dept: FAMILY MEDICINE CLINIC | Age: 59
End: 2024-09-11
Payer: COMMERCIAL

## 2024-09-11 RX ORDER — DEXTROMETHORPHAN HYDROBROMIDE AND PROMETHAZINE HYDROCHLORIDE 15; 6.25 MG/5ML; MG/5ML
5 SYRUP ORAL 4 TIMES DAILY PRN
Qty: 120 ML | Refills: 0 | Status: SHIPPED | OUTPATIENT
Start: 2024-09-11

## 2024-09-11 NOTE — TELEPHONE ENCOUNTER
Pt said she has a cough and congestion and is needing her promethazine cough syrup refilled.  It was last filled 03/2024.  She was wanting an appt today but nothing is available. Scheduled for tomorrow with acute provider Eve jason.  Pt is wanting to know if A Correa would go ahead and refill it today?

## 2024-09-12 ENCOUNTER — OFFICE VISIT (OUTPATIENT)
Dept: FAMILY MEDICINE CLINIC | Age: 59
End: 2024-09-12
Payer: COMMERCIAL

## 2024-09-12 VITALS
BODY MASS INDEX: 24.95 KG/M2 | HEART RATE: 85 BPM | DIASTOLIC BLOOD PRESSURE: 85 MMHG | WEIGHT: 135.6 LBS | OXYGEN SATURATION: 95 % | HEIGHT: 62 IN | TEMPERATURE: 97.9 F | SYSTOLIC BLOOD PRESSURE: 130 MMHG

## 2024-09-12 DIAGNOSIS — J06.9 VIRAL URI WITH COUGH: Primary | ICD-10-CM

## 2024-09-12 DIAGNOSIS — Z76.0 ENCOUNTER FOR MEDICATION REFILL: ICD-10-CM

## 2024-09-12 PROCEDURE — 99213 OFFICE O/P EST LOW 20 MIN: CPT | Performed by: NURSE PRACTITIONER

## 2024-09-12 PROCEDURE — 87428 SARSCOV & INF VIR A&B AG IA: CPT | Performed by: NURSE PRACTITIONER

## 2024-09-12 RX ORDER — MOMETASONE FUROATE AND FORMOTEROL FUMARATE DIHYDRATE 100; 5 UG/1; UG/1
2 AEROSOL RESPIRATORY (INHALATION)
Qty: 13 G | Refills: 0 | Status: SHIPPED | OUTPATIENT
Start: 2024-09-12 | End: 2024-10-12

## 2024-09-12 NOTE — PROGRESS NOTES
"Chief Complaint  URI (Cough, congestion onset yesterday )    Subjective      Lisa Luna is a 59 year old female that presents to [facility] with c/o cough that started 2 nights ago. Cough is mildly productive. She also admits to some mild shortness of breath and chills. No fever, body aches or sinus symptoms. She has been taking phenergan DM as prescribed by her pcp yesterday. She has also been using her albuterol inhaler but states she is out of the dulera.  No known sick contacts but states she works with a lot of people.       History of Present Illness    Current Outpatient Medications   Medication Instructions    albuterol sulfate  (90 Base) MCG/ACT inhaler 2 puffs, Inhalation, Every 4 Hours PRN    mometasone-formoterol (Dulera) 100-5 MCG/ACT inhaler 2 puffs, Inhalation, 2 Times Daily - RT    promethazine-dextromethorphan (PROMETHAZINE-DM) 6.25-15 MG/5ML syrup Take 5mL by mouth 4 (Four) Times a Day As Needed for Cough.    triamcinolone (KENALOG) 0.1 % ointment 1 Application, Topical, 2 Times Daily       The following portions of the patient's history were reviewed and updated as appropriate: allergies, current medications, past family history, past medical history, past social history, past surgical history, and problem list.    Objective   Vital Signs:   /85 (BP Location: Left arm, Patient Position: Sitting)   Pulse 85   Temp 97.9 °F (36.6 °C) (Oral)   Ht 157.5 cm (62.01\")   Wt 61.5 kg (135 lb 9.6 oz)   SpO2 95%   BMI 24.79 kg/m²     Wt Readings from Last 3 Encounters:   09/12/24 61.5 kg (135 lb 9.6 oz)   07/08/24 62.3 kg (137 lb 6.4 oz)   06/26/24 61.1 kg (134 lb 12.8 oz)     BP Readings from Last 3 Encounters:   09/12/24 130/85   07/08/24 113/79   06/26/24 133/74     Physical Exam  Vitals and nursing note reviewed.   Constitutional:       Appearance: Normal appearance. She is not ill-appearing.   HENT:      Head: Normocephalic and atraumatic. "   Cardiovascular:      Rate and Rhythm: Normal rate and regular rhythm.      Heart sounds: Normal heart sounds.   Pulmonary:      Effort: Pulmonary effort is normal.      Breath sounds: Normal breath sounds. No wheezing or rhonchi.   Skin:     General: Skin is warm and dry.      Capillary Refill: Capillary refill takes less than 2 seconds.   Neurological:      Mental Status: She is alert and oriented to person, place, and time.   Psychiatric:         Mood and Affect: Mood normal.         Behavior: Behavior normal.        Result Review :  The following data was reviewed by: SOHAN Anderson on 09/12/2024:      Common labs          6/24/2024    06:30 6/25/2024    05:40 7/8/2024    10:09   Common Labs   Glucose 91     89        BUN 20     14        Creatinine 0.55     0.48        Sodium 143     143        Potassium 3.4     3.5        Chloride 107     108        Calcium 8.6     8.5        WBC   6.25    Hemoglobin   10.6    Hematocrit   36.2    Platelets   323       Details          This result is from an external source.               Lab Results (last 72 hours)       Procedure Component Value Units Date/Time    POCT SARS-CoV-2 Antigen JOSE D + Flu [[id number]] Collected: 09/12/24 0957    Specimen: Swab Updated: 09/12/24 0959     SARS Antigen Not Detected     Influenza A Antigen JOSE D Not Detected     Influenza B Antigen JOSE D Not Detected     Internal Control Passed     Lot Number [id number]     Expiration Date 12-             No Images in the past 120 days found..    Lab Results   Component Value Date    SARSANTIGEN Not Detected 09/12/2024    COVID19 Not Detected 01/25/2022    FLUAAG Not Detected 09/12/2024    FLUBAG Not Detected 09/12/2024    RAPSCRN Negative 03/06/2023    BILIRUBINUR Negative 09/30/2022       Procedures        Assessment and Plan   Diagnoses and all orders for this visit:    1. Viral URI with cough (Primary)  -     POCT SARS-CoV-2 Antigen JOSE D + Flu    2. Encounter for medication refill  -      mometasone-formoterol (Dulera) 100-5 MCG/ACT inhaler; Inhale 2 puffs 2 (Two) Times a Day for 30 days.  Dispense: 8.8 g; Refill: 0        BMI is within normal parameters. No other follow-up for BMI required.         Medications Discontinued During This Encounter   Medication Reason    mometasone-formoterol (Dulera) 100-5 MCG/ACT inhaler Reorder    O2 (OXYGEN) *Therapy completed          Follow Up   No follow-ups on file.  Patient was given instructions and counseling regarding her condition or for health maintenance advice. Please see specific information pulled into the AVS if appropriate.       Eve Jacobs, SOHAN  09/12/24  10:05 EDT

## 2024-09-12 NOTE — Clinical Note
September 12, 2024     Patient: Lisa Luna   YOB: 1965   Date of Visit: 9/12/2024       To Whom It May Concern:    It is my medical opinion that Lisa Luna may return to work in three days.            Sincerely,        SOHAN Anderson    CC: No Recipients

## 2024-09-12 NOTE — Clinical Note
September 12, 2024     Patient: Lisa Luna   YOB: 1965   Date of Visit: 9/12/2024       To Whom It May Concern:    It is my medical opinion that Lisa Luna may return to work in one day.            Sincerely,        SOHAN Anderson    CC: No Recipients

## 2024-09-12 NOTE — PATIENT INSTRUCTIONS
Supportive care with rest, plenty of fluids, tylenol/ibuprofen for pain and/or fever as needed per label instructions.  You may find a cool-mist humidifier helpful as well as throat lozenges, Chloraseptic spray and warm salt water gargles. Should you develop shortness of breath, chest pain or worsening of symptoms please go to the ER.

## 2024-09-12 NOTE — LETTER
September 12, 2024     Patient: Lisa Luna   YOB: 1965   Date of Visit: 9/12/2024       To Whom It May Concern:    Please excuse patient for absence on 9/11/24.  Lisa Luan may return to work today 9/12/24.            Sincerely,        SOHAN Anderson    CC: No Recipients

## 2024-09-12 NOTE — Clinical Note
September 12, 2024     Patient: Lisa Luna   YOB: 1965   Date of Visit: 9/12/2024       To Whom It May Concern:    It is my medical opinion that Lisa Luna may return to work in two days.         Sincerely,        SOHAN Anderson    CC: No Recipients

## 2024-10-09 DIAGNOSIS — J43.8 OTHER EMPHYSEMA: ICD-10-CM

## 2024-10-09 DIAGNOSIS — Z76.0 ENCOUNTER FOR MEDICATION REFILL: ICD-10-CM

## 2024-10-09 NOTE — TELEPHONE ENCOUNTER
Rx Refill Note  Requested Prescriptions     Pending Prescriptions Disp Refills    albuterol sulfate  (90 Base) MCG/ACT inhaler 6.7 g 5     Sig: Inhale 2 puffs Every 4 (Four) Hours As Needed for Shortness of Air.    mometasone-formoterol (Dulera) 100-5 MCG/ACT inhaler 13 g 0     Sig: Inhale 2 puffs 2 (Two) Times a Day for 30 days.      Last office visit with prescribing clinician: 7/8/2024   Last telemedicine visit with prescribing clinician: Visit date not found   Next office visit with prescribing clinician: Visit date not found      Aide Ruby LPN  10/09/24, 13:51 EDT

## 2024-10-10 RX ORDER — MOMETASONE FUROATE AND FORMOTEROL FUMARATE DIHYDRATE 100; 5 UG/1; UG/1
2 AEROSOL RESPIRATORY (INHALATION)
Qty: 13 G | Refills: 0 | Status: SHIPPED | OUTPATIENT
Start: 2024-10-10 | End: 2024-11-13

## 2024-10-10 RX ORDER — ALBUTEROL SULFATE 90 UG/1
2 INHALANT RESPIRATORY (INHALATION) EVERY 4 HOURS PRN
Qty: 6.7 G | Refills: 0 | Status: SHIPPED | OUTPATIENT
Start: 2024-10-10

## 2024-10-10 NOTE — TELEPHONE ENCOUNTER
When I last saw her I pended a CXR for her to get rechecked and she has not done, was in recently and saw an acute care provider for a URI, so send one refill of each rx but contact her to get that CXR

## 2024-10-21 ENCOUNTER — OFFICE VISIT (OUTPATIENT)
Dept: FAMILY MEDICINE CLINIC | Age: 59
End: 2024-10-21
Payer: COMMERCIAL

## 2024-10-21 VITALS
DIASTOLIC BLOOD PRESSURE: 78 MMHG | SYSTOLIC BLOOD PRESSURE: 120 MMHG | OXYGEN SATURATION: 95 % | TEMPERATURE: 98.6 F | WEIGHT: 133.2 LBS | HEART RATE: 80 BPM | HEIGHT: 62 IN | BODY MASS INDEX: 24.51 KG/M2

## 2024-10-21 DIAGNOSIS — J20.9 ACUTE BRONCHITIS, UNSPECIFIED ORGANISM: Primary | ICD-10-CM

## 2024-10-21 DIAGNOSIS — Z76.0 ENCOUNTER FOR MEDICATION REFILL: ICD-10-CM

## 2024-10-21 PROCEDURE — 99213 OFFICE O/P EST LOW 20 MIN: CPT | Performed by: INTERNAL MEDICINE

## 2024-10-21 RX ORDER — AMOXICILLIN AND CLAVULANATE POTASSIUM 600; 42.9 MG/5ML; MG/5ML
600 POWDER, FOR SUSPENSION ORAL 3 TIMES DAILY
Qty: 150 ML | Refills: 0 | Status: SHIPPED | OUTPATIENT
Start: 2024-10-21 | End: 2024-10-21

## 2024-10-21 RX ORDER — MOMETASONE FUROATE AND FORMOTEROL FUMARATE DIHYDRATE 100; 5 UG/1; UG/1
2 AEROSOL RESPIRATORY (INHALATION)
Qty: 13 G | Refills: 0 | Status: SHIPPED | OUTPATIENT
Start: 2024-10-21 | End: 2024-11-20

## 2024-10-21 RX ORDER — METHYLPREDNISOLONE 4 MG
TABLET, DOSE PACK ORAL
Qty: 21 EACH | Refills: 0 | Status: SHIPPED | OUTPATIENT
Start: 2024-10-21

## 2024-10-21 NOTE — PROGRESS NOTES
"Chief Complaint  Nasal Congestion (Fever, chills; productive cough w/ greenish colored sputum x 1 week ) and Headache    Subjective      Lisa Luna is a 59 y.o. female who presents to [facility]     Patient Care Team:  Lelo Correa APRN as PCP - General (Family Medicine)    Patient presents with 1 week history of worsening shortness of air and cough.  She has known emphysema secondary to tobacco use with COPD maintained on Dulera.  She reports she was admitted last June to the hospital for COPD exacerbation.  She was seen in our office in September and does not feel like she fully has improved.  She has been around several sick contacts including her grandchild.  She denies any fevers or chills.  She has been vaccinated for COVID and the flu.  Not interested in testing at this time.        Review of Symptoms  Answers submitted by the patient for this visit:  Primary Reason for Visit (Submitted on 10/19/2024)  What is the primary reason for your visit?: Cough  Cough Questionnaire (Submitted on 10/19/2024)  Chief Complaint: Cough  Chronicity: recurrent  Onset: in the past 7 days  Progression since onset: coming and going  Frequency: every few hours  Cough characteristics: rattling  chills: Yes  headaches: Yes  myalgias: Yes  nasal congestion: Yes  rhinorrhea: Yes  sore throat: Yes  Aggravated by: nothing  Objective   Vital Signs:   Vitals:    10/21/24 0838   BP: 120/78   BP Location: Left arm   Patient Position: Sitting   Cuff Size: Adult   Pulse: 80   Temp: 98.6 °F (37 °C)   TempSrc: Temporal   SpO2: 95%   Weight: 60.4 kg (133 lb 3.2 oz)   Height: 157.5 cm (62.01\")     Body mass index is 24.36 kg/m².    Wt Readings from Last 3 Encounters:   10/21/24 60.4 kg (133 lb 3.2 oz)   09/12/24 61.5 kg (135 lb 9.6 oz)   07/08/24 62.3 kg (137 lb 6.4 oz)     BP Readings from Last 3 Encounters:   10/21/24 120/78   09/12/24 130/85   07/08/24 113/79       Health Maintenance   Topic Date " Due    COLORECTAL CANCER SCREENING  Never done    Pneumococcal Vaccine 0-64 (1 of 2 - PCV) Never done    ZOSTER VACCINE (1 of 2) Never done    PAP SMEAR  07/01/2021    COVID-19 Vaccine (5 - 2023-24 season) 09/01/2024    ANNUAL PHYSICAL  02/05/2025    MAMMOGRAM  04/10/2025    LIPID PANEL  06/21/2025    TDAP/TD VACCINES (2 - Td or Tdap) 01/25/2032    HEPATITIS C SCREENING  Completed    INFLUENZA VACCINE  Completed       Physical Exam   GEN:NAD, well nourished  HEENT:NCAT, PERRL, EOMI, , MMM, right ear with bulging and erythema of the canal and fluid behind the membrane oropharynx is erythematous without exudate  NECK:supple, no JVD, no carotid bruits  CVA:RRR s1, s2 no m/r/g  CHEST: Distant breath sounds but clear with no wheezing or rhonchi  ABD:soft, nontender, nondistended +bs  EXT:no c/c/e 2+PP B  NEURO:CN II-XII grossly nonfocal, no evidence of asterixes or tremor  PSYCH: appropriate mood and affect  :deferred  SKIN: warm, dry, intact, no lesions or rashes    Result Review   The following data was reviewed by: Eloise Hernández MD on 10/21/2024:  [x]  Tests & Results  []  Hospitalization/Emergency Department/Urgent Care  []  Internal/External Consultant Notes    Procedures          ASSESSMENT/PLAN  Diagnoses and all orders for this visit:    1. Acute bronchitis, unspecified organism (Primary)  Comments:  Augmentin x 10 days.  Medrol Dosepak  Continue to use albuterol and Dulera    2. Encounter for medication refill  -     mometasone-formoterol (Dulera) 100-5 MCG/ACT inhaler; Inhale 2 puffs 2 (Two) Times a Day  Dispense: 13 g; Refill: 0    Other orders  -     Discontinue: amoxicillin-clavulanate (Augmentin ES-600) 600-42.9 MG/5ML suspension; Take 5 mL by mouth 3 times a day for 10 days.  Dispense: 150 mL; Refill: 0  -     methylPREDNISolone (Medrol) 4 MG dose pack; Take as directed on package instructions.  Dispense: 21 each; Refill: 0  -     amoxicillin-clavulanate (AUGMENTIN) 875-125 MG per tablet; Take 1  tablet by mouth 2 (Two) Times a Day for 10 days.  Dispense: 20 tablet; Refill: 0        BMI is within normal parameters. No other follow-up for BMI required.       Lisa Luna  reports that she has been smoking cigarettes. She started smoking about 40 years ago. She has a 30 pack-year smoking history. She has never been exposed to tobacco smoke. She has never used smokeless tobacco. I have educated her on the risk of diseases from using tobacco products such as cancer, COPD, heart disease, and arterial disease.              FOLLOW UP  No follow-ups on file.  Patient was given instructions and counseling regarding her condition or for health maintenance advice. Please see specific information pulled into the AVS if appropriate.       Eloise Hernández MD  10/21/24  08:54 EDT

## 2024-10-21 NOTE — LETTER
October 21, 2024     Patient: Lisa Luna   YOB: 1965   Date of Visit: 10/21/2024       To Whom It May Concern:      Please excuse Lisa Luna for her absence on Friday 10/18/2024 due to illness.             Sincerely,        Eloise Hernández MD    CC: No Recipients

## 2024-11-06 DIAGNOSIS — J43.8 OTHER EMPHYSEMA: ICD-10-CM

## 2024-11-06 RX ORDER — ALBUTEROL SULFATE 90 UG/1
2 INHALANT RESPIRATORY (INHALATION) EVERY 4 HOURS PRN
Qty: 6.7 G | Refills: 1 | Status: SHIPPED | OUTPATIENT
Start: 2024-11-06

## 2024-11-20 ENCOUNTER — HOSPITAL ENCOUNTER (OUTPATIENT)
Dept: GENERAL RADIOLOGY | Facility: HOSPITAL | Age: 59
Discharge: HOME | End: 2024-11-20
Admitting: NURSE PRACTITIONER
Payer: COMMERCIAL

## 2024-11-20 ENCOUNTER — OFFICE VISIT (OUTPATIENT)
Dept: FAMILY MEDICINE CLINIC | Age: 59
End: 2024-11-20
Payer: COMMERCIAL

## 2024-11-20 VITALS
BODY MASS INDEX: 23.92 KG/M2 | SYSTOLIC BLOOD PRESSURE: 97 MMHG | HEART RATE: 101 BPM | HEIGHT: 62 IN | DIASTOLIC BLOOD PRESSURE: 66 MMHG | TEMPERATURE: 99.1 F | WEIGHT: 130 LBS | OXYGEN SATURATION: 93 %

## 2024-11-20 DIAGNOSIS — R05.9 COUGH, UNSPECIFIED TYPE: ICD-10-CM

## 2024-11-20 DIAGNOSIS — R05.9 COUGH, UNSPECIFIED TYPE: Primary | ICD-10-CM

## 2024-11-20 PROCEDURE — 71046 X-RAY EXAM CHEST 2 VIEWS: CPT

## 2024-11-20 PROCEDURE — 87428 SARSCOV & INF VIR A&B AG IA: CPT | Performed by: NURSE PRACTITIONER

## 2024-11-20 PROCEDURE — 99213 OFFICE O/P EST LOW 20 MIN: CPT | Performed by: NURSE PRACTITIONER

## 2024-11-20 RX ORDER — DEXTROMETHORPHAN HYDROBROMIDE AND PROMETHAZINE HYDROCHLORIDE 15; 6.25 MG/5ML; MG/5ML
5 SYRUP ORAL NIGHTLY PRN
Qty: 120 ML | Refills: 0 | Status: SHIPPED | OUTPATIENT
Start: 2024-11-20

## 2024-11-20 RX ORDER — CEFDINIR 300 MG/1
300 CAPSULE ORAL 2 TIMES DAILY
Qty: 20 CAPSULE | Refills: 0 | Status: SHIPPED | OUTPATIENT
Start: 2024-11-20

## 2024-11-20 NOTE — ASSESSMENT & PLAN NOTE
Send for CXR, continue mucinex during the day and tylenol, try rx cough rx at night, cover her for bronchitis, continue peak flow and she monitors her oxygen at home.  Rest, increase fluids, follow up if symptoms progress or change   Cover her for work last night and tonight.

## 2024-11-20 NOTE — PROGRESS NOTES
Chief Complaint  Cough (Cough, fatigue, body aches x 1 week. )    Subjective          Lisa Luna presents to [facility]  History of Present Illness  URI  When did symptoms started a week ago   Any exposures: has been coughing   Symptoms:productive yellow / green sputum with cough, fatigue, body aches, drainage    Treatment tried:OTC cough syrup and tylenol, uses peak flow at home to help   History of COPD   Missed work last night    PAST MEDICAL HISTORY changes since 2024:         Hospitalized:      pneumonia     covid +     COPD/smoker        GYNECOLOGICAL HISTORY:        Menopause at age 48.          PREVENTIVE HEALTH MAINTENANCE             COLORECTAL CANCER SCREENING: declines colorectal cancer screening, understands reason for testing     Hepatitis C Medicare Screening: was last done 19; negative       PAP SMEAR: was last done 2018 with normal results and neg HPV         Surgical History:        NONE         Family History:       Father:  at age 76;  lymphoma     Mother:  at age 75     Daughter(s): 2 daughter(s) total;  Type 2 Diabetes         Social History: [employer]        Occupation: a [employer].   TBKY works second shift /also works [employer] part time (off work now)     Marital Status:      Children: 2 children                  History reviewed. No pertinent past medical history.    Allergies   Allergen Reactions    Erythromycin Rash        History reviewed. No pertinent surgical history.     Social History     Tobacco Use    Smoking status: Some Days     Average packs/day: 0.6 packs/day for 50.0 years (30.0 ttl pk-yrs)     Types: Cigarettes     Start date: 1984     Passive exposure: Never    Smokeless tobacco: Never   Substance Use Topics    Alcohol use: Not Currently       History reviewed. No pertinent family history.     Health Maintenance Due   Topic Date Due    COLORECTAL CANCER SCREENING  Never done     "Pneumococcal Vaccine 0-64 (1 of 2 - PCV) Never done    ZOSTER VACCINE (1 of 2) Never done    LUNG CANCER SCREENING  Never done    PAP SMEAR  07/01/2021        Current Outpatient Medications on File Prior to Visit   Medication Sig    albuterol sulfate  (90 Base) MCG/ACT inhaler Inhale 2 puffs Every 4 (Four) Hours As Needed for Shortness of Air.    mometasone-formoterol (Dulera) 100-5 MCG/ACT inhaler Inhale 2 puffs 2 (Two) Times a Day    triamcinolone (KENALOG) 0.1 % ointment Apply 1 Application topically to the appropriate area as directed 2 (Two) Times a Day.    methylPREDNISolone (Medrol) 4 MG dose pack Take as directed on package instructions. (Patient not taking: Reported on 11/20/2024)     No current facility-administered medications on file prior to visit.       Immunization History   Administered Date(s) Administered    COVID-19 (MODERNA) 1st,2nd,3rd Dose Monovalent 01/15/2021, 03/23/2021, 04/24/2021, 01/13/2022    Flu Vaccine Quad PF 6-35MO 01/15/2022    Fluzone (or Fluarix & Flulaval for VFC) >6mos 10/28/2022, 10/23/2023    Influenza Inj MDCK Preserative Free 09/23/2024    Tdap 01/25/2022       Review of Systems   Constitutional:  Negative for fever.   HENT:  Negative for sore throat.    Respiratory:  Positive for cough. Negative for shortness of breath.    Cardiovascular:  Negative for chest pain.        Objective     Vitals:    11/20/24 0955   BP: 97/66   BP Location: Left arm   Patient Position: Sitting   Cuff Size: Adult   Pulse: 101   Temp: 99.1 °F (37.3 °C)   TempSrc: Oral   SpO2: 93%   Weight: 59 kg (130 lb)   Height: 157.5 cm (62.01\")            Physical Exam  Constitutional:       General: She is not in acute distress.     Appearance: Normal appearance.   HENT:      Right Ear: Tympanic membrane, ear canal and external ear normal.      Left Ear: Tympanic membrane, ear canal and external ear normal.      Nose: Nose normal.      Mouth/Throat:      Pharynx: Oropharynx is clear. No posterior " oropharyngeal erythema.   Cardiovascular:      Rate and Rhythm: Normal rate and regular rhythm.      Heart sounds: No murmur heard.  Pulmonary:      Effort: Pulmonary effort is normal.      Breath sounds: Normal breath sounds.   Lymphadenopathy:      Cervical: No cervical adenopathy.   Neurological:      Mental Status: She is alert.   Psychiatric:         Mood and Affect: Mood normal.         Behavior: Behavior normal.         Result Review :     The following data was reviewed by: SOHAN Foreman on 11/20/2024:  Results for orders placed or performed in visit on 11/20/24   POCT SARS-CoV-2 Antigen JOSE D + Flu    Collection Time: 11/20/24 10:17 AM    Specimen: Swab   Result Value Ref Range    SARS Antigen Not Detected Not Detected, Presumptive Negative    Influenza A Antigen JOSE D Not Detected Not Detected    Influenza B Antigen JOSE D Not Detected Not Detected    Internal Control Passed Passed    Lot Number [id number]     Expiration Date 6-28-25                           Assessment and Plan      Diagnoses and all orders for this visit:    1. Cough, unspecified type (Primary)  Assessment & Plan:  Send for CXR, continue mucinex during the day and tylenol, try rx cough rx at night, cover her for bronchitis, continue peak flow and she monitors her oxygen at home.  Rest, increase fluids, follow up if symptoms progress or change   Cover her for work last night and tonight.       Orders:  -     POCT SARS-CoV-2 Antigen JOSE D + Flu  -     XR Chest 2 View; Future  -     cefdinir (OMNICEF) 300 MG capsule; Take 1 capsule by mouth 2 (Two) Times a Day.  Dispense: 20 capsule; Refill: 0    Other orders  -     promethazine-dextromethorphan (PROMETHAZINE-DM) 6.25-15 MG/5ML syrup; Take 5 mL by mouth At Night As Needed for Cough.  Dispense: 120 mL; Refill: 0        BMI is within normal parameters. No other follow-up for BMI required.         Follow Up     Return if symptoms worsen or fail to improve, for follow up pending x-ray  result.    Patient was given instructions and counseling regarding her condition or for health maintenance advice. Please see specific information pulled into the AVS if appropriate.

## 2024-11-20 NOTE — LETTER
November 20, 2024     Patient: Lisa Luna   YOB: 1965   Date of Visit: 11/20/2024       To Whom It May Concern:    It is my medical opinion that Lisa Luna be excused from work 11/19/2024 and 11/20/2024. She may return to work on 11/21/2024.         Sincerely,        SOHAN Foreman    CC: No Recipients

## 2024-11-21 ENCOUNTER — TELEPHONE (OUTPATIENT)
Dept: FAMILY MEDICINE CLINIC | Age: 59
End: 2024-11-21
Payer: COMMERCIAL

## 2024-11-21 DIAGNOSIS — J18.9 PNEUMONIA OF BOTH LOWER LOBES DUE TO INFECTIOUS ORGANISM: Primary | ICD-10-CM

## 2024-11-21 RX ORDER — DOXYCYCLINE 100 MG/1
100 TABLET ORAL 2 TIMES DAILY
Qty: 20 TABLET | Refills: 0 | Status: SHIPPED | OUTPATIENT
Start: 2024-11-21

## 2024-11-26 ENCOUNTER — TELEPHONE (OUTPATIENT)
Dept: FAMILY MEDICINE CLINIC | Age: 59
End: 2024-11-26
Payer: COMMERCIAL

## 2024-11-26 NOTE — TELEPHONE ENCOUNTER
RECEIVED PATIENT FMLA, CALLED PATIENT TO INFORM, NO ANSWER LVM TO CALL OFFICE BACK. FORMS ARE IN THE INCOMPLETE BOX.

## 2024-12-10 DIAGNOSIS — Z76.0 ENCOUNTER FOR MEDICATION REFILL: ICD-10-CM

## 2024-12-11 ENCOUNTER — TELEPHONE (OUTPATIENT)
Dept: FAMILY MEDICINE CLINIC | Age: 59
End: 2024-12-11
Payer: COMMERCIAL

## 2024-12-11 RX ORDER — MOMETASONE FUROATE AND FORMOTEROL FUMARATE DIHYDRATE 100; 5 UG/1; UG/1
2 AEROSOL RESPIRATORY (INHALATION)
Qty: 13 G | Refills: 0 | Status: SHIPPED | OUTPATIENT
Start: 2024-12-11 | End: 2025-01-10

## 2024-12-11 NOTE — TELEPHONE ENCOUNTER
See how she is doing.  I saw her last month and ordered CXR, it was abnormal, I had her on ATB and Dr Mitchell added an ATB   And if she has been using this inhaler and it helps, send in the refills

## 2024-12-11 NOTE — TELEPHONE ENCOUNTER
Rx Refill Note  Requested Prescriptions     Pending Prescriptions Disp Refills    mometasone-formoterol (Dulera) 100-5 MCG/ACT inhaler 13 g 0     Sig: Inhale 2 puffs 2 (Two) Times a Day      Last office visit with prescribing clinician: 11/20/24, LIBERTY Correa  Last telemedicine visit with prescribing clinician: Visit date not found   Next office visit with prescribing clinician: Visit date not found  Previously ordered by Dr SHARAN Hernández.     Aide Ruby LPN  12/11/24, 09:28 EST

## 2024-12-11 NOTE — TELEPHONE ENCOUNTER
FMLA for November was denied because patient only missed 2 consecutive days, and it has to be 3 or more to be approved.  Patient is asking if she can have 3 days per month of intermittent leave for COPD flare ups in the future.

## 2025-01-07 DIAGNOSIS — Z76.0 ENCOUNTER FOR MEDICATION REFILL: ICD-10-CM

## 2025-01-07 DIAGNOSIS — J43.8 OTHER EMPHYSEMA: ICD-10-CM

## 2025-01-08 RX ORDER — ALBUTEROL SULFATE 90 UG/1
2 INHALANT RESPIRATORY (INHALATION) EVERY 4 HOURS PRN
Qty: 6.7 G | Refills: 1 | Status: SHIPPED | OUTPATIENT
Start: 2025-01-08

## 2025-01-08 RX ORDER — MOMETASONE FUROATE AND FORMOTEROL FUMARATE DIHYDRATE 100; 5 UG/1; UG/1
2 AEROSOL RESPIRATORY (INHALATION)
Qty: 13 G | Refills: 0 | Status: SHIPPED | OUTPATIENT
Start: 2025-01-08 | End: 2025-02-07

## 2025-03-12 DIAGNOSIS — J43.8 OTHER EMPHYSEMA: ICD-10-CM

## 2025-03-12 RX ORDER — ALBUTEROL SULFATE 90 UG/1
2 INHALANT RESPIRATORY (INHALATION) EVERY 4 HOURS PRN
Qty: 6.7 G | Refills: 0 | Status: SHIPPED | OUTPATIENT
Start: 2025-03-12

## 2025-05-07 ENCOUNTER — TELEPHONE (OUTPATIENT)
Dept: FAMILY MEDICINE CLINIC | Age: 60
End: 2025-05-07
Payer: COMMERCIAL

## 2025-05-14 ENCOUNTER — OFFICE VISIT (OUTPATIENT)
Dept: FAMILY MEDICINE CLINIC | Age: 60
End: 2025-05-14
Payer: COMMERCIAL

## 2025-05-14 ENCOUNTER — TELEPHONE (OUTPATIENT)
Dept: FAMILY MEDICINE CLINIC | Age: 60
End: 2025-05-14

## 2025-05-14 VITALS
OXYGEN SATURATION: 96 % | DIASTOLIC BLOOD PRESSURE: 82 MMHG | TEMPERATURE: 98.1 F | BODY MASS INDEX: 23.92 KG/M2 | HEIGHT: 62 IN | HEART RATE: 78 BPM | WEIGHT: 130 LBS | SYSTOLIC BLOOD PRESSURE: 128 MMHG

## 2025-05-14 DIAGNOSIS — R53.83 OTHER FATIGUE: ICD-10-CM

## 2025-05-14 DIAGNOSIS — J43.8 OTHER EMPHYSEMA: Primary | ICD-10-CM

## 2025-05-14 DIAGNOSIS — R93.89 ABNORMAL CHEST X-RAY: ICD-10-CM

## 2025-05-14 PROCEDURE — 99213 OFFICE O/P EST LOW 20 MIN: CPT | Performed by: NURSE PRACTITIONER

## 2025-05-14 RX ORDER — MOMETASONE FUROATE AND FORMOTEROL FUMARATE DIHYDRATE 100; 5 UG/1; UG/1
2 AEROSOL RESPIRATORY (INHALATION)
Qty: 13 G | Refills: 1 | Status: SHIPPED | OUTPATIENT
Start: 2025-05-14 | End: 2025-06-13

## 2025-05-14 RX ORDER — ALBUTEROL SULFATE 90 UG/1
2 INHALANT RESPIRATORY (INHALATION) EVERY 4 HOURS PRN
Qty: 6.7 G | Refills: 1 | Status: SHIPPED | OUTPATIENT
Start: 2025-05-14

## 2025-05-14 RX ORDER — CETIRIZINE HYDROCHLORIDE 5 MG/1
5 TABLET ORAL AS NEEDED
COMMUNITY

## 2025-05-14 NOTE — PROGRESS NOTES
Chief Complaint  Other emphysema (Follow up / med refills )    Subjective          Lisa Luna presents to [facility]  History of Present Illness  COPD  History of abnormal CXR  and   Still smoking but only 1-2 cig per day  Dulera helps, needs refill     Went to a clinic in [place] in late 2025 and had labs   A1C was 5.3 , , HDL normal >60    PAST MEDICAL HISTORY changes since 2024:         Hospitalized:      pneumonia     covid +     COPD/smoker        GYNECOLOGICAL HISTORY:        Menopause at age 48.          PREVENTIVE HEALTH MAINTENANCE             COLORECTAL CANCER SCREENING: declines colorectal cancer screening, understands reason for testing     Hepatitis C Medicare Screening: was last done 19; negative       PAP SMEAR: was last done 2018 with normal results and neg HPV         Surgical History:        NONE         Family History:       Father:  at age 76;  lymphoma     Mother:  at age 75     Daughter(s): 2 daughter(s) total;  Type 2 Diabetes         Social History: [employer]        Occupation: a [employer].   TBKY works second shift /also works [employer] part time (off work now)     Marital Status:      Children: 2 children                     History reviewed. No pertinent past medical history.    Allergies   Allergen Reactions    Aspirin GI Intolerance    Erythromycin Rash        History reviewed. No pertinent surgical history.     Social History     Tobacco Use    Smoking status: Some Days     Average packs/day: 0.6 packs/day for 50.0 years (30.0 ttl pk-yrs)     Types: Cigarettes     Start date: 1984     Passive exposure: Never    Smokeless tobacco: Never   Substance Use Topics    Alcohol use: Not Currently       History reviewed. No pertinent family history.     Health Maintenance Due   Topic Date Due    Pneumococcal Vaccine 50+ (1 of 2 - PCV) Never done    COLORECTAL CANCER SCREENING   Never done    ZOSTER VACCINE (1 of 2) Never done    PAP SMEAR  02/02/2021    COVID-19 Vaccine (5 - 2024-25 season) 09/01/2024    ANNUAL PHYSICAL  02/05/2025    MAMMOGRAM  04/10/2025    LUNG CANCER SCREENING  06/21/2025        Current Outpatient Medications on File Prior to Visit   Medication Sig    cetirizine (zyrTEC) 5 MG tablet Take 1 tablet by mouth As Needed for Allergies.    triamcinolone (KENALOG) 0.1 % ointment Apply 1 Application topically to the appropriate area as directed 2 (Two) Times a Day.    [DISCONTINUED] albuterol sulfate  (90 Base) MCG/ACT inhaler Inhale 2 puffs Every 4 (Four) Hours As Needed for Shortness of Air. *Need to schedule appt*    [DISCONTINUED] mometasone-formoterol (Dulera) 100-5 MCG/ACT inhaler Inhale 2 puffs 2 (Two) Times a Day    [DISCONTINUED] cefdinir (OMNICEF) 300 MG capsule Take 1 capsule by mouth 2 (Two) Times a Day. (Patient not taking: Reported on 5/14/2025)    [DISCONTINUED] doxycycline (ADOXA) 100 MG tablet Take 1 tablet by mouth 2 (Two) Times a Day. Take with food. (Patient not taking: Reported on 5/14/2025)    [DISCONTINUED] promethazine-dextromethorphan (PROMETHAZINE-DM) 6.25-15 MG/5ML syrup Take 5 mL by mouth At Night As Needed for Cough. (Patient not taking: Reported on 5/14/2025)     No current facility-administered medications on file prior to visit.       Immunization History   Administered Date(s) Administered    COVID-19 (MODERNA) 1st,2nd,3rd Dose Monovalent 01/15/2021, 03/23/2021, 04/24/2021, 01/13/2022    Flu Vaccine Quad PF 6-35MO 01/15/2022    Fluzone (or Fluarix & Flulaval for VFC) >6mos 10/28/2022, 10/23/2023    Influenza Inj MDCK Preserative Free 09/23/2024    Tdap 01/25/2022       Review of Systems   Constitutional:  Positive for fatigue (always tired). Negative for fever.   Respiratory:  Negative for cough and shortness of breath.    Cardiovascular:  Negative for chest pain, palpitations and leg swelling.        Objective     Vitals:    05/14/25  "1347   BP: 128/82   BP Location: Right arm   Patient Position: Sitting   Cuff Size: Adult   Pulse: 78   Temp: 98.1 °F (36.7 °C)   TempSrc: Oral   SpO2: 96%   Weight: 59 kg (130 lb)   Height: 157.5 cm (62.01\")            Physical Exam  Vitals reviewed.   Constitutional:       General: She is not in acute distress.     Appearance: Normal appearance.   Neck:      Vascular: No carotid bruit.   Cardiovascular:      Rate and Rhythm: Normal rate and regular rhythm.      Heart sounds: Normal heart sounds. No murmur heard.  Pulmonary:      Effort: Pulmonary effort is normal. No respiratory distress.      Breath sounds: No wheezing (but diffuse decrease breath sounds).   Musculoskeletal:      Right lower leg: No edema.      Left lower leg: No edema.   Neurological:      Mental Status: She is alert.   Psychiatric:         Mood and Affect: Mood normal.         Behavior: Behavior normal.         Result Review :     The following data was reviewed by: SOHAN Foreman on 05/14/2025:                       Assessment and Plan      Assessment & Plan  Other emphysema    Reviewed previous CXR's, advised to completely stop smoking, will send her dulera and albuterol to pharmacy, she reports the dulera may be too expensive, discussed pulmonology consult     Orders:    XR Chest 2 View; Future    mometasone-formoterol (Dulera) 100-5 MCG/ACT inhaler; Inhale 2 puffs 2 (Two) Times a Day    albuterol sulfate  (90 Base) MCG/ACT inhaler; Inhale 2 puffs Every 4 (Four) Hours As Needed for Shortness of Air.    Abnormal chest x-ray  Repeating CXR today, reviewed previous   Orders:    XR Chest 2 View; Future    Other fatigue  Reviewed her chart, reviewed her labs she had 2-27-25  in a clinic in [place]                 BMI is within normal parameters. No other follow-up for BMI required.         Follow Up     Return if symptoms worsen or fail to improve, for follow up pending x-ray result.    Patient was given instructions and " counseling regarding her condition or for health maintenance advice. Please see specific information pulled into the AVS if appropriate.

## 2025-05-14 NOTE — TELEPHONE ENCOUNTER
----- Message from Lelo Correa sent at 5/14/2025  2:11 PM EDT -----  In media is the orders but not the labs on 2-29-25, I need the labs

## 2025-05-14 NOTE — ASSESSMENT & PLAN NOTE
Reviewed previous CXR's, advised to completely stop smoking, will send her dulera and albuterol to pharmacy, she reports the dulera may be too expensive, discussed pulmonology consult     Orders:    XR Chest 2 View; Future    mometasone-formoterol (Dulera) 100-5 MCG/ACT inhaler; Inhale 2 puffs 2 (Two) Times a Day    albuterol sulfate  (90 Base) MCG/ACT inhaler; Inhale 2 puffs Every 4 (Four) Hours As Needed for Shortness of Air.

## 2025-06-02 ENCOUNTER — TELEPHONE (OUTPATIENT)
Dept: FAMILY MEDICINE CLINIC | Age: 60
End: 2025-06-02
Payer: COMMERCIAL

## 2025-07-22 DIAGNOSIS — J43.8 OTHER EMPHYSEMA: ICD-10-CM

## 2025-07-22 RX ORDER — ALBUTEROL SULFATE 90 UG/1
2 INHALANT RESPIRATORY (INHALATION) EVERY 4 HOURS PRN
Qty: 6.7 G | Refills: 1 | Status: SHIPPED | OUTPATIENT
Start: 2025-07-22